# Patient Record
Sex: FEMALE | Race: WHITE | NOT HISPANIC OR LATINO | Employment: PART TIME | URBAN - METROPOLITAN AREA
[De-identification: names, ages, dates, MRNs, and addresses within clinical notes are randomized per-mention and may not be internally consistent; named-entity substitution may affect disease eponyms.]

---

## 2019-06-20 ENCOUNTER — NON-PROVIDER VISIT (OUTPATIENT)
Dept: OCCUPATIONAL MEDICINE | Facility: CLINIC | Age: 19
End: 2019-06-20
Payer: COMMERCIAL

## 2019-06-20 DIAGNOSIS — Z02.1 PRE-EMPLOYMENT DRUG SCREENING: ICD-10-CM

## 2019-06-20 DIAGNOSIS — Z02.1 PRE-EMPLOYMENT HEALTH SCREENING EXAMINATION: ICD-10-CM

## 2019-06-20 LAB
AMP AMPHETAMINE: NORMAL
COC COCAINE: NORMAL
INT CON NEG: NORMAL
INT CON POS: NORMAL
MET METHAMPHETAMINES: NORMAL
OPI OPIATES: NORMAL
PCP PHENCYCLIDINE: NORMAL
POC DRUG COMMENT 753798-OCCUPATIONAL HEALTH: NEGATIVE
THC: NORMAL

## 2019-06-20 PROCEDURE — 86580 TB INTRADERMAL TEST: CPT | Performed by: PREVENTIVE MEDICINE

## 2019-06-20 PROCEDURE — 80305 DRUG TEST PRSMV DIR OPT OBS: CPT | Performed by: PREVENTIVE MEDICINE

## 2019-06-23 ENCOUNTER — NON-PROVIDER VISIT (OUTPATIENT)
Dept: URGENT CARE | Facility: CLINIC | Age: 19
End: 2019-06-23
Payer: COMMERCIAL

## 2019-06-23 LAB — TB WHEAL 3D P 5 TU DIAM: NORMAL MM

## 2019-09-19 ENCOUNTER — GYNECOLOGY VISIT (OUTPATIENT)
Dept: OBGYN | Facility: MEDICAL CENTER | Age: 19
End: 2019-09-19
Payer: COMMERCIAL

## 2019-09-19 VITALS — WEIGHT: 129 LBS | SYSTOLIC BLOOD PRESSURE: 100 MMHG | DIASTOLIC BLOOD PRESSURE: 70 MMHG

## 2019-09-19 DIAGNOSIS — Z30.09 ENCOUNTER FOR COUNSELING REGARDING CONTRACEPTION: ICD-10-CM

## 2019-09-19 DIAGNOSIS — Z11.3 ROUTINE SCREENING FOR STI (SEXUALLY TRANSMITTED INFECTION): ICD-10-CM

## 2019-09-19 PROCEDURE — 99202 OFFICE O/P NEW SF 15 MIN: CPT | Performed by: OBSTETRICS & GYNECOLOGY

## 2019-09-19 RX ORDER — DROSPIRENONE AND ETHINYL ESTRADIOL 0.03MG-3MG
1 KIT ORAL DAILY
Qty: 28 TAB | Refills: 11 | Status: SHIPPED | OUTPATIENT
Start: 2019-09-19 | End: 2021-01-29

## 2019-09-19 SDOH — HEALTH STABILITY: MENTAL HEALTH: HOW OFTEN DO YOU HAVE A DRINK CONTAINING ALCOHOL?: NEVER

## 2019-09-19 NOTE — PROGRESS NOTES
GYN Visit    CC: discuss contraception    HPI: Yadira Massey is a 18 y.o.  here to discuss contraception.  Patient reports she has been on the pill in the past but was not very good at taking it every day.  Currently using condoms only for contraception, one current sexual partner.  Taking classes at [employer], not really sure she wants to study.  Reports her periods have been a little irregular since stopping birth control in April, previously have always been normal.  Reports she has taken a negative pregnancy test      ROS:  constitutional: denies fevers, general concerns  CV: denies chest pain, palpitations, edema  Resp: denies shortness of breath, cough  GI: denies abd pain, N/V, diarrhea/constipation, blood in stool  : + vaginal bleeding (on period now), discharge, pain, denies urinary complaints  Neuro: denies hx of migraines   Psych: reports no concerns about mood, feels well    OB History    Para Term  AB Living   0 0 0 0 0 0   SAB TAB Ectopic Molar Multiple Live Births   0 0 0 0 0 0       GYN Hx:   Menses last 5 days, regular until recently  Denies hx of STIs  Reports that she has completed the Gardasil series  Never had a vaginal exam    Past Medical History:   Diagnosis Date   • Back pain        Past Surgical History:   Procedure Laterality Date   • OTHER      Heart   • TONSILLECTOMY         Medications:   None    Allergies: Patient has no known allergies.    Social History     Tobacco Use   • Smoking status: Never Smoker   • Smokeless tobacco: Never Used   Substance Use Topics   • Alcohol use: Never     Frequency: Never   • Drug use: Never         Family History   Problem Relation Age of Onset   • Thyroid Maternal Aunt    • Cancer Maternal Grandmother         breast   denies VTEs in the family    Physical Exam:  /70 (BP Location: Left arm, Patient Position: Sitting)   Wt 58.5 kg (129 lb)   LMP 2019   Breastfeeding? No   gen: AAO, NAD, mood and affect  appropriate      A/P: 18 y.o.  here to discuss contraception  1. Encounter for counseling regarding contraception  REFERRAL TO GYNECOLOGY   2. Routine screening for STI (sexually transmitted infection)  CHLAMYDIA/GC PCR URINE OR SWAB   I discussed with Yadira Massey efficacy and side effects of contraceptive options including combined hormonal contraceptives (COCs, nuvaring, patch), IUD (levonorgestrel and copper), nexplanon and Depo-medroxyprogesterone acetate.  Patient reports she thought IUDs can be used in women who have been pregnant in the past, reports she was told this by another physician who is very old.  Discussed that this is not true, and that we commonly use IUDs in women who have never been pregnant and most of them do very well with them.  Also discussed the risk of uterine perforation, which patient reports her mom had, as 1.4 per 1000 IUD insertions.  After our discussion, pt desires nexplanon.  Discussed to expect irregular bleeding with Nexplanon, which can improve with time sometimes continues throughout  Use.  Patient expressed understanding.    Recommend STI screen, at least gonorrhea and chlamydia.  Patient amenable to gonorrhea and chlamydia screen of the urine in the lab.  Declines pelvic exam today or self collection with vaginal swab in the office.  Patient reports she had a bunch of blood work recently, declines serum STI screening.    Patient is status post Gardasil series    Offered and excepting of interim contraception prior to next home placement.  Patient desires ALMA ROSA.  Reviewed increased risk of VTE with all estrogen containing medications.    Nexplanon ordered today.  RTC as soon as device available for placement    Patient was seen for 20 minutes of which > 50% of appointment time was spent on face-to-face counseling and coordination of care regarding the above.      Domitila Da Silva MD  [facility], Women's Health

## 2019-10-29 ENCOUNTER — HOSPITAL ENCOUNTER (OUTPATIENT)
Dept: LAB | Facility: MEDICAL CENTER | Age: 19
End: 2019-10-29
Attending: OBSTETRICS & GYNECOLOGY
Payer: COMMERCIAL

## 2019-10-29 DIAGNOSIS — Z11.3 ROUTINE SCREENING FOR STI (SEXUALLY TRANSMITTED INFECTION): ICD-10-CM

## 2019-10-29 PROCEDURE — 87591 N.GONORRHOEAE DNA AMP PROB: CPT

## 2019-10-29 PROCEDURE — 87491 CHLMYD TRACH DNA AMP PROBE: CPT

## 2019-10-30 ENCOUNTER — GYNECOLOGY VISIT (OUTPATIENT)
Dept: OBGYN | Facility: MEDICAL CENTER | Age: 19
End: 2019-10-30
Payer: COMMERCIAL

## 2019-10-30 DIAGNOSIS — Z30.017 NEXPLANON INSERTION: Primary | ICD-10-CM

## 2019-10-30 LAB
C TRACH DNA SPEC QL NAA+PROBE: NEGATIVE
N GONORRHOEA DNA SPEC QL NAA+PROBE: NEGATIVE
SPECIMEN SOURCE: NORMAL

## 2019-10-30 PROCEDURE — 11981 INSERTION DRUG DLVR IMPLANT: CPT | Performed by: NURSE PRACTITIONER

## 2019-10-30 NOTE — PROGRESS NOTES
nexplanon insert Exp 4/24/22  Lot [id number]  Patient is currently on OCP. I have advised her for 1 week overlap then she may DC OCP.     Procedure note; Nexplanon insertion;    Informed consent obtained, consent signed-discussed the risks of Nexplanon; pain, bleeding, infection, bruising, possible pregnancy, difficulty with removing implant, possible scarring to arm.   All the risks and benefits of the procedure and the device are explained and patient verbalizes understanding and signs the consent     Urine hCG negative    Patient positioned supine with nondominant arm exposed.    Medial epicondyle of left arm palpated    Surgical cyndie placed 8-10 cm medial to the medial epicondyle    Betadine prep the skin    Local anesthesia-1% lidocaine injected using a 1 1/2 inch 27 gauge needle     Implant inserted via the Nexplanon insertion device subdermally in a sterile fashion    The patient and provider can feel the implant under the skin and the blue end of the insertion device is present indicating implant insertion    Steristrip and sterile 4x4's     Pressure bandage placed    Patient instructed to remove dressing  in 24 hours    Patient instructed to use a band-aid for 2 days there after    Patient tolerated the procedure well    Followup in 4-6  weeks for post insertion checkup

## 2020-03-01 ENCOUNTER — OFFICE VISIT (OUTPATIENT)
Dept: URGENT CARE | Facility: CLINIC | Age: 20
End: 2020-03-01
Payer: COMMERCIAL

## 2020-03-01 VITALS
HEIGHT: 67 IN | HEART RATE: 83 BPM | TEMPERATURE: 98.8 F | DIASTOLIC BLOOD PRESSURE: 62 MMHG | OXYGEN SATURATION: 97 % | RESPIRATION RATE: 16 BRPM | BODY MASS INDEX: 19.3 KG/M2 | WEIGHT: 123 LBS | SYSTOLIC BLOOD PRESSURE: 102 MMHG
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
## 2020-03-01 DIAGNOSIS — L08.9 SOFT TISSUE INFECTION: ICD-10-CM

## 2020-03-01 PROCEDURE — 99203 OFFICE O/P NEW LOW 30 MIN: CPT | Performed by: FAMILY MEDICINE

## 2020-03-01 RX ORDER — ETONOGESTREL 68 MG/1
1 IMPLANT SUBCUTANEOUS ONCE
COMMUNITY
End: 2021-01-29

## 2020-03-01 RX ORDER — AMOXICILLIN AND CLAVULANATE POTASSIUM 875; 125 MG/1; MG/1
1 TABLET, FILM COATED ORAL 2 TIMES DAILY
Qty: 14 TAB | Refills: 0 | Status: SHIPPED | OUTPATIENT
Start: 2020-03-01 | End: 2020-03-08

## 2020-03-01 ASSESSMENT — ENCOUNTER SYMPTOMS
VOMITING: 0
NAUSEA: 0
MYALGIAS: 0
WEIGHT LOSS: 0
EYE REDNESS: 0
EYE DISCHARGE: 0

## 2020-03-01 NOTE — PROGRESS NOTES
"Subjective:      Yadira Massey is a 19 y.o. female who presents with Other (red spot on nose, concerned it may be infected, there for 2 days.)            2d worsening redness and pain at site of dogbite to nose. Healthy known vaccinated dog. Some purulent drainage. No fever. Pain is moderate. No other aggravating or alleviating factors.        Review of Systems   Constitutional: Negative for malaise/fatigue and weight loss.   Eyes: Negative for discharge and redness.   Gastrointestinal: Negative for nausea and vomiting.   Musculoskeletal: Negative for joint pain and myalgias.   Skin: Negative for itching and rash.     .  Medications, Allergies, and current problem list reviewed today in Epic       Objective:     /62 (BP Location: Right arm, Patient Position: Sitting, BP Cuff Size: Adult)   Pulse 83   Temp 37.1 °C (98.8 °F) (Temporal)   Resp 16   Ht 1.702 m (5' 7\")   Wt 55.8 kg (123 lb)   SpO2 97%   BMI 19.26 kg/m²      Physical Exam  Constitutional:       General: She is not in acute distress.     Appearance: She is well-developed.   HENT:      Head: Normocephalic and atraumatic.        Mouth/Throat:      Mouth: Mucous membranes are moist.      Pharynx: Oropharynx is clear.   Eyes:      Conjunctiva/sclera: Conjunctivae normal.   Cardiovascular:      Rate and Rhythm: Normal rate and regular rhythm.      Heart sounds: Normal heart sounds. No murmur.   Pulmonary:      Effort: Pulmonary effort is normal.      Breath sounds: Normal breath sounds. No wheezing.   Skin:     General: Skin is warm and dry.      Findings: No rash.   Neurological:      Mental Status: She is alert and oriented to person, place, and time.                 Assessment/Plan:     1. Dog bite, initial encounter  amoxicillin-clavulanate (AUGMENTIN) 875-125 MG Tab   2. Soft tissue infection  amoxicillin-clavulanate (AUGMENTIN) 875-125 MG Tab     Differential diagnosis, natural history, supportive care, and indications for immediate " follow-up discussed at length.

## 2020-05-24 ENCOUNTER — HOSPITAL ENCOUNTER (OUTPATIENT)
Dept: RADIOLOGY | Facility: MEDICAL CENTER | Age: 20
End: 2020-05-24
Attending: PHYSICIAN ASSISTANT
Payer: COMMERCIAL

## 2020-05-24 ENCOUNTER — OFFICE VISIT (OUTPATIENT)
Dept: URGENT CARE | Facility: CLINIC | Age: 20
End: 2020-05-24
Payer: COMMERCIAL

## 2020-05-24 VITALS
HEART RATE: 72 BPM | TEMPERATURE: 98.1 F | WEIGHT: 120 LBS | RESPIRATION RATE: 16 BRPM | HEIGHT: 66 IN | SYSTOLIC BLOOD PRESSURE: 110 MMHG | OXYGEN SATURATION: 97 % | DIASTOLIC BLOOD PRESSURE: 68 MMHG | BODY MASS INDEX: 19.29 KG/M2

## 2020-05-24 DIAGNOSIS — R22.31 AXILLARY MASS, RIGHT: ICD-10-CM

## 2020-05-24 PROCEDURE — 76882 US LMTD JT/FCL EVL NVASC XTR: CPT | Mod: RT

## 2020-05-24 PROCEDURE — 99214 OFFICE O/P EST MOD 30 MIN: CPT | Performed by: PHYSICIAN ASSISTANT

## 2020-05-24 ASSESSMENT — ENCOUNTER SYMPTOMS
EYES NEGATIVE: 1
FOCAL WEAKNESS: 0
TINGLING: 0
CHILLS: 0
FEVER: 0
CARDIOVASCULAR NEGATIVE: 1
GASTROINTESTINAL NEGATIVE: 1
SENSORY CHANGE: 0
RESPIRATORY NEGATIVE: 1

## 2020-05-24 NOTE — PROGRESS NOTES
"Subjective:      Yadira Massey is a 19 y.o. female who presents with Adenopathy (Couple wks nodes s wollen on right armpit.)        HPI    Two swollen hard lumps to right armpit onset around 1 month.     Painful. Pain is increasing gradually. Exacerbated with moving arm such as turning the steering wheel.   Gradually enlarging.   Has been shaving. She tried not shaving for 2 weeks with no change.   No treatments tried.     No recent illness illness in the past month.  She denies any other symptoms.  Denies any fever, chills, redness, swelling, numbness, tingling, headache, vision changes.  No overlying skin changes or discharge.   No personal history of cancer.  Grandmother had breast cancer..    Review of Systems   Constitutional: Negative for chills and fever.   HENT: Negative.    Eyes: Negative.    Respiratory: Negative.    Cardiovascular: Negative.    Gastrointestinal: Negative.    Skin:        Lumps in right arm pit  Painful lumps   Neurological: Negative for tingling, sensory change and focal weakness.          Objective:     /68   Pulse 72   Temp 36.7 °C (98.1 °F)   Resp 16   Ht 1.676 m (5' 6\")   Wt 54.4 kg (120 lb)   SpO2 97%   BMI 19.37 kg/m²      Physical Exam  Vitals signs reviewed. Exam conducted with a chaperone present.   Constitutional:       General: She is not in acute distress.     Appearance: Normal appearance. She is not ill-appearing or toxic-appearing.   HENT:      Mouth/Throat:      Mouth: Mucous membranes are moist.      Pharynx: Oropharynx is clear. No oropharyngeal exudate or posterior oropharyngeal erythema.   Eyes:      Conjunctiva/sclera: Conjunctivae normal.      Pupils: Pupils are equal, round, and reactive to light.   Cardiovascular:      Rate and Rhythm: Normal rate and regular rhythm.      Heart sounds: Normal heart sounds.   Pulmonary:      Effort: Pulmonary effort is normal. No respiratory distress.      Breath sounds: Normal breath sounds. No wheezing, " rhonchi or rales.   Chest:      Chest wall: No mass, lacerations, deformity, swelling, tenderness, crepitus or edema.      Breasts: Breasts are symmetrical.         Right: Normal. No swelling, bleeding, inverted nipple, mass, nipple discharge, skin change or tenderness.         Left: Normal. No swelling, bleeding, inverted nipple, mass, nipple discharge, skin change or tenderness.          Comments: Right axilla: Two small 0.5 - 1 cm round hard mobile nodule. Painful to the palpation.   No erythema, edema, skin discoloration, discharge, fluctuance.    Abdominal:      General: Abdomen is flat. Bowel sounds are normal. There is no distension.      Palpations: There is no mass.      Tenderness: There is no abdominal tenderness. There is no guarding or rebound.   Lymphadenopathy:      Cervical: No cervical adenopathy.      Upper Body:      Right upper body: Axillary adenopathy present. No supraclavicular or pectoral adenopathy.      Left upper body: No supraclavicular, axillary or pectoral adenopathy.      Lower Body: No right inguinal adenopathy. No left inguinal adenopathy.   Skin:     General: Skin is warm and dry.      Findings: No bruising, erythema, lesion or rash.   Neurological:      General: No focal deficit present.      Mental Status: She is alert and oriented to person, place, and time.   Psychiatric:         Mood and Affect: Mood normal.         Behavior: Behavior normal.       Past Medical History:   Diagnosis Date   • Back pain       Past Surgical History:   Procedure Laterality Date   • OTHER      Heart   • TONSILLECTOMY        Social History     Socioeconomic History   • Marital status: Single     Spouse name: Not on file   • Number of children: Not on file   • Years of education: Not on file   • Highest education level: Not on file   Occupational History   • Not on file   Social Needs   • Financial resource strain: Not on file   • Food insecurity     Worry: Not on file     Inability: Not on file   •  Transportation needs     Medical: Not on file     Non-medical: Not on file   Tobacco Use   • Smoking status: Never Smoker   • Smokeless tobacco: Never Used   Substance and Sexual Activity   • Alcohol use: Never     Frequency: Never   • Drug use: Never   • Sexual activity: Yes     Partners: Male   Lifestyle   • Physical activity     Days per week: Not on file     Minutes per session: Not on file   • Stress: Not on file   Relationships   • Social connections     Talks on phone: Not on file     Gets together: Not on file     Attends [employer] service: Not on file     Active member of club or organization: Not on file     Attends meetings of clubs or organizations: Not on file     Relationship status: Not on file   • Intimate partner violence     Fear of current or ex partner: Not on file     Emotionally abused: Not on file     Physically abused: Not on file     Forced sexual activity: Not on file   Other Topics Concern   • Not on file   Social History Narrative   • Not on file    Patient has no known allergies.         Assessment/Plan:     1. Axillary mass, right    - US-EXTREMITY NON VASCULAR UNILATERAL RIGHT; Results per radiologist interpretation below    TECHNIQUE/EXAM DESCRIPTION AND NUMBER OF VIEWS:  Ultrasound of the soft tissues of the right axilla.     COMPARISON: None     FINDINGS:  In the area of palpable abnormality as indicated by the patient in the right axilla, there is an ill-defined collection measuring 1.17 x 0.72 x 0.35 cm located within the subcutaneous soft tissues. A second heterogeneous collection measures 0.76 x 0.54 x   0.3 cm. There is no significant hyperemia identified.     IMPRESSION:     2 heterogeneous collections within the subcutaneous soft tissues in the right axilla in the area of palpable abnormality. These could represent hematomas if the patient has had a history of trauma. Infection is not excluded though no significant   hyperemia is seen. Clinical correlation  recommended.    05/25/20: Spoke to the patient on the phone regarding ultrasound results per radiologist interpretation above.  Discussed with patient this may be inflamed gland or cyst within subcutaneous tissue as evident from examination and reproduction of pain.  Her suspicions for metastasis very well.  We will treat for infection with doxycycline for 7 days.  Also have recommended warm compresses 4 times per day for about 25 minutes each time. Ibuprofen for pain.   Referral to family medicine for follow-up appointment.  Return to the urgent care if symptoms are not improving or changing in 1 week or sooner if any worsening symptoms such as redness of the skin, swelling, drainage, increasing in size of masses, fevers, chills or any other concerns.     Supportive care, differential diagnoses, and indications for immediate follow-up discussed with patient.    Pathogenesis of diagnosis discussed including typical length and natural progression. Patient expresses understanding and agrees to plan.    Please note that this dictation was created using voice recognition software. I have made every reasonable attempt to correct obvious errors, but I expect that there are errors of grammar and possibly content that I did not discover before finalizing the note.

## 2020-05-25 RX ORDER — DOXYCYCLINE HYCLATE 100 MG
100 TABLET ORAL 2 TIMES DAILY
Qty: 14 TAB | Refills: 0 | Status: SHIPPED | OUTPATIENT
Start: 2020-05-25 | End: 2020-06-01

## 2021-01-29 ENCOUNTER — GYNECOLOGY VISIT (OUTPATIENT)
Dept: OBGYN | Facility: CLINIC | Age: 21
End: 2021-01-29
Payer: COMMERCIAL

## 2021-01-29 VITALS — BODY MASS INDEX: 20.34 KG/M2 | DIASTOLIC BLOOD PRESSURE: 75 MMHG | SYSTOLIC BLOOD PRESSURE: 115 MMHG | WEIGHT: 126 LBS

## 2021-01-29 DIAGNOSIS — Z30.46 NEXPLANON REMOVAL: ICD-10-CM

## 2021-01-29 PROCEDURE — 11982 REMOVE DRUG IMPLANT DEVICE: CPT | Performed by: OBSTETRICS & GYNECOLOGY

## 2021-01-29 RX ORDER — NORGESTIMATE AND ETHINYL ESTRADIOL 0.25-0.035
1 KIT ORAL DAILY
Qty: 84 TAB | Refills: 4 | Status: SHIPPED | OUTPATIENT
Start: 2021-01-29 | End: 2022-04-25

## 2021-01-29 NOTE — NON-PROVIDER
Pt states that she would like her nexplanon removed because she has been bleeding for a year and she is very caputo  Pharmacy verified  Good # [phone]

## 2021-01-29 NOTE — PROGRESS NOTES
Procedure Note : Nexplanon Removal       Pt desires nexplanon removal for continuous bleeding, not interested in trying any medication with nexplanon still in place.      Nexplanon easily palpable in LUE.      Left upper extremity positioned so the medial aspect is exposed.  Area over palpable nexplanon insert cleansed with betadine x3.  approximately 2cc of 1% licocaine with epinephrine infiltrated over distal end of implant.  Approximately 2-3mm incision made with 11 blade, nexplanon grasped with hemostat walked out with 2 hemostats, progressively grasping up the device and breaking up adhesions to device with 4x4.  Device removed through incision, noted to be intact, and discarded.  Pressure dressing placed.  Pt tolerated procedure well.  Complications: none        - prior to removal, I discussed w/ pt risks including pain/bleeding/infection.  All questions answered and consents signed.  Remove pressure dressing tonight or prior of uncomfortable    - alternate contraception: pt desires ALMA ROSA for now.  rx sent to pharmacy, if start right away will not have break in contraceptive coverage.   - pt to return for WWE (due for first pap)    Domitila Da Silva MD  [facility], Women's Health

## 2021-02-01 ENCOUNTER — HOSPITAL ENCOUNTER (EMERGENCY)
Facility: MEDICAL CENTER | Age: 21
End: 2021-02-01
Attending: EMERGENCY MEDICINE
Payer: COMMERCIAL

## 2021-02-01 ENCOUNTER — APPOINTMENT (OUTPATIENT)
Dept: RADIOLOGY | Facility: MEDICAL CENTER | Age: 21
End: 2021-02-01
Attending: EMERGENCY MEDICINE
Payer: COMMERCIAL

## 2021-02-01 VITALS
HEART RATE: 78 BPM | WEIGHT: 127.21 LBS | BODY MASS INDEX: 20.44 KG/M2 | OXYGEN SATURATION: 98 % | SYSTOLIC BLOOD PRESSURE: 114 MMHG | DIASTOLIC BLOOD PRESSURE: 74 MMHG | RESPIRATION RATE: 16 BRPM | HEIGHT: 66 IN | TEMPERATURE: 99 F

## 2021-02-01 DIAGNOSIS — G89.29 CHRONIC NONINTRACTABLE HEADACHE, UNSPECIFIED HEADACHE TYPE: ICD-10-CM

## 2021-02-01 DIAGNOSIS — R51.9 CHRONIC NONINTRACTABLE HEADACHE, UNSPECIFIED HEADACHE TYPE: ICD-10-CM

## 2021-02-01 DIAGNOSIS — Z30.41 ORAL CONTRACEPTIVE USE: ICD-10-CM

## 2021-02-01 LAB
ALBUMIN SERPL BCP-MCNC: 4.9 G/DL (ref 3.2–4.9)
ALBUMIN/GLOB SERPL: 1.6 G/DL
ALP SERPL-CCNC: 99 U/L (ref 30–99)
ALT SERPL-CCNC: 14 U/L (ref 2–50)
ANION GAP SERPL CALC-SCNC: 11 MMOL/L (ref 7–16)
AST SERPL-CCNC: 17 U/L (ref 12–45)
BASOPHILS # BLD AUTO: 0.4 % (ref 0–1.8)
BASOPHILS # BLD: 0.03 K/UL (ref 0–0.12)
BILIRUB SERPL-MCNC: 0.5 MG/DL (ref 0.1–1.5)
BUN SERPL-MCNC: 17 MG/DL (ref 8–22)
CALCIUM SERPL-MCNC: 9.8 MG/DL (ref 8.4–10.2)
CHLORIDE SERPL-SCNC: 100 MMOL/L (ref 96–112)
CO2 SERPL-SCNC: 24 MMOL/L (ref 20–33)
CREAT SERPL-MCNC: 0.71 MG/DL (ref 0.5–1.4)
EOSINOPHIL # BLD AUTO: 0.04 K/UL (ref 0–0.51)
EOSINOPHIL NFR BLD: 0.6 % (ref 0–6.9)
ERYTHROCYTE [DISTWIDTH] IN BLOOD BY AUTOMATED COUNT: 39.8 FL (ref 35.9–50)
GLOBULIN SER CALC-MCNC: 3.1 G/DL (ref 1.9–3.5)
GLUCOSE SERPL-MCNC: 87 MG/DL (ref 65–99)
HCG SERPL QL: NEGATIVE
HCT VFR BLD AUTO: 43.9 % (ref 37–47)
HGB BLD-MCNC: 14.8 G/DL (ref 12–16)
IMM GRANULOCYTES # BLD AUTO: 0.01 K/UL (ref 0–0.11)
IMM GRANULOCYTES NFR BLD AUTO: 0.1 % (ref 0–0.9)
LYMPHOCYTES # BLD AUTO: 2.37 K/UL (ref 1–4.8)
LYMPHOCYTES NFR BLD: 33.2 % (ref 22–41)
MCH RBC QN AUTO: 29.2 PG (ref 27–33)
MCHC RBC AUTO-ENTMCNC: 33.7 G/DL (ref 33.6–35)
MCV RBC AUTO: 86.8 FL (ref 81.4–97.8)
MONOCYTES # BLD AUTO: 0.49 K/UL (ref 0–0.85)
MONOCYTES NFR BLD AUTO: 6.9 % (ref 0–13.4)
NEUTROPHILS # BLD AUTO: 4.2 K/UL (ref 2–7.15)
NEUTROPHILS NFR BLD: 58.8 % (ref 44–72)
NRBC # BLD AUTO: 0 K/UL
NRBC BLD-RTO: 0 /100 WBC
PLATELET # BLD AUTO: 276 K/UL (ref 164–446)
PMV BLD AUTO: 9.9 FL (ref 9–12.9)
POTASSIUM SERPL-SCNC: 4 MMOL/L (ref 3.6–5.5)
PROT SERPL-MCNC: 8 G/DL (ref 6–8.2)
RBC # BLD AUTO: 5.06 M/UL (ref 4.2–5.4)
SODIUM SERPL-SCNC: 135 MMOL/L (ref 135–145)
WBC # BLD AUTO: 7.1 K/UL (ref 4.8–10.8)

## 2021-02-01 PROCEDURE — 99283 EMERGENCY DEPT VISIT LOW MDM: CPT

## 2021-02-01 PROCEDURE — 700117 HCHG RX CONTRAST REV CODE 255: Performed by: EMERGENCY MEDICINE

## 2021-02-01 PROCEDURE — 84703 CHORIONIC GONADOTROPIN ASSAY: CPT

## 2021-02-01 PROCEDURE — 80053 COMPREHEN METABOLIC PANEL: CPT

## 2021-02-01 PROCEDURE — 70496 CT ANGIOGRAPHY HEAD: CPT

## 2021-02-01 PROCEDURE — 85025 COMPLETE CBC W/AUTO DIFF WBC: CPT

## 2021-02-01 RX ORDER — HYDROCODONE BITARTRATE AND ACETAMINOPHEN 5; 325 MG/1; MG/1
1 TABLET ORAL ONCE
Status: DISCONTINUED | OUTPATIENT
Start: 2021-02-01 | End: 2021-02-01 | Stop reason: HOSPADM

## 2021-02-01 RX ORDER — BUTALBITAL, ACETAMINOPHEN AND CAFFEINE 50; 325; 40 MG/1; MG/1; MG/1
1 TABLET ORAL EVERY 4 HOURS PRN
Qty: 15 TAB | Refills: 0 | Status: SHIPPED | OUTPATIENT
Start: 2021-02-01 | End: 2021-02-06

## 2021-02-01 RX ADMIN — IOHEXOL 80 ML: 350 INJECTION, SOLUTION INTRAVENOUS at 19:02

## 2021-02-02 NOTE — ED PROVIDER NOTES
ED Provider Note    Chief Complaint:   Headache    HPI:  Yadira Massey is a 20 y.o. female who presents for evaluation of headache.  She describes headache that has been ongoing for the past 3 months, progressively worsening since time of onset.  She describes a persistent dull headache localized to the temporal areas, as well as the forehead.  She states that 2 to 3 weeks ago her symptoms seem to have significantly worsened, now describing worsening pain as well as associated lightheadedness.  She has not had any associated nausea or vomiting, but does have associated photophobia.  She denies any associated neck pain, denies chest pain or shortness of breath.  She reports no prior history of DVT nor pulmonary embolism, very recently had her Nexplanon contraception removed, and within the past week transition to an oral contraceptive tablet.  Otherwise, she reports no DVT risk factors.  She has not had any associated fevers, no neck or back pain.  She denies any vision changes.  She states that she feels some fatigue with less energy than usual, but denies weakness in the arms or legs, denies symptoms of vertigo, denies any difficulty with ambulation.  She is unable to identify any exacerbating factors.    Review of Systems:  See HPI for pertinent positives and negatives. All other systems negative.    Past Medical History:   has a past medical history of Back pain.    Social History:  Social History     Tobacco Use   • Smoking status: Never Smoker   • Smokeless tobacco: Never Used   Substance and Sexual Activity   • Alcohol use: Never     Frequency: Never   • Drug use: Never   • Sexual activity: Yes     Partners: Male       Surgical History:   has a past surgical history that includes other and tonsillectomy.    Current Medications:  Home Medications    **Home medications have not yet been reviewed for this encounter**         Allergies:  No Known Allergies    Physical Exam:  Vital Signs: /85   Pulse  "81   Temp 36.6 °C (97.8 °F) (Temporal)   Resp 16   Ht 1.676 m (5' 6\")   Wt 57.7 kg (127 lb 3.3 oz)   LMP 01/31/2021 (Exact Date)   SpO2 98%   BMI 20.53 kg/m²   Constitutional: Alert, no acute distress  HENT: Normocephalic, mask in place  Eyes: Pupils equal and reactive, normal conjunctiva  Neck: Supple, normal range of motion, no stridor  Cardiovascular: Extremities are warm and well perfused  Pulmonary: No respiratory distress, normal work of breathing  Abdomen: Nondistended  Skin: Warm, dry, no rashes or lesions  Musculoskeletal: Normal range of motion in all extremities, no swelling or deformity noted  Neurologic: CN II-XII intact, speech normal, muscle strength 5/5 in all four extremities, normal  strength bilaterally, sensation grossly intact, normal finger-to-nose, no pronator drift  Psychiatric: Normal and appropriate mood and affect    Medical records reviewed for continuity of care.  GYN visit reviewed from 1/29/2021.  Patient was seen for Nexplanon removal and switch to combined oral contraceptive tablets.    Labs:  Labs Reviewed   CBC WITH DIFFERENTIAL   COMP METABOLIC PANEL   HCG QUAL SERUM   ESTIMATED GFR       Radiology:  CT-CTA HEAD WITH & W/O-POST PROCESS   Final Result      No CT evidence of dural venous or other thrombosis      CT angiogram of the [place] within normal limits.           ED Medications Administered:  Medications   HYDROcodone-acetaminophen (NORCO) 5-325 MG per tablet 1 Tab (1 Tab Oral Refused 2/1/21 1830)   iohexol (OMNIPAQUE) 350 mg/mL (80 mL Intravenous Given 2/1/21 1902)       Differential diagnosis:  Tension headache, headache not otherwise specified, intracranial mass, dural sinus thrombus    MDM:  Patient presents for evaluation of 3 months of headache, worse over the last 2 to 3 weeks.  On arrival to the emergency department she has no focal neurologic deficits, no nausea, no vomiting.  She reports no history of head injury.  She is afebrile, has no " history of fevers, no meningeal signs, no evidence of infectious etiology.  No thunderclap onset to suggest subarachnoid hemorrhage.    Due to Nexplanon and oral contraceptive tablet use, I did obtain a CTV to evaluate for dural sinus thrombus.  MRI is not available at this facility after hours.    Screening lab work ordered to obtain contrasted study, CBC and CMP are within normal limits, hCG is negative.    CT of the head with and without contrast is within normal limits, no CT evidence of dural venous or other thrombosis.  No intracranial mass identified.    At this time, etiology of the patient's headaches are unclear.  She was without focal neurologic deficits.  Plan is for discharge home, will prescribe a short course of Fioricet to see if this helps with her headaches.  She is also referred to neurology given her chronic headaches without cause.  She is counseled follow-up with her primary care physician within 24 to 48 hours to review her emergency department visit today. Return precautions were discussed with the patient, and provided in written form with the patient's discharge instructions.     Personal protective equipment including N95 surgical respirator, goggles, and gloves were used during this encounter.     Disposition:  Discharge home in stable condition    Final Impression:  1. Chronic nonintractable headache, unspecified headache type    2. Oral contraceptive use        Electronically signed by: Pao Gayle MD, 2/1/2021 7:42 PM

## 2021-02-02 NOTE — DISCHARGE INSTRUCTIONS
Please follow-up with your primary care physician.  If you do not have a primary care physician, you may review your insurance documentation to find a primary care physician who is in network.  You may also try to primary care clinic listed above.  You may also call [facility] to schedule follow-up appointment.  Please call the neurologist listed above to schedule a follow-up appointment as well, in the event that your primary care physician is unable to effectively treat your headaches.  Return to the emergency department if you develop any new or worsening symptoms including worsening headache, nausea, vomiting, vision changes, fevers, or if you have any further concerns.  Additionally, please return in 24 to 48 hours if the medication is not improving your symptoms, and your headaches are not improving.

## 2021-02-02 NOTE — ED TRIAGE NOTES
Chief Complaint   Patient presents with   • Headache     3 months of a constant headache, mother anali come to the ED tonight to have a work up.   This headache is at her temples and forehead, sometimes it moves to the back of her head when she lies down. No N/V but does report lightheadedness.

## 2021-02-02 NOTE — ED NOTES
Discharge instructions given and discussed. RX for Fioricet  given and pt educated. Pt educated to come back to ER for new or worsening symptoms and follow up with PCP as instructed. Pt verbalized understanding. VSS. Pt  Discharged in stable condition.

## 2021-02-02 NOTE — ED NOTES
Pt alert and oriented. Pt complains of temporal headache, denies n/v or vision changes. Pt states she has been feeling like she has less energy.

## 2021-02-25 ENCOUNTER — NURSE TRIAGE (OUTPATIENT)
Dept: HEALTH INFORMATION MANAGEMENT | Facility: OTHER | Age: 21
End: 2021-02-25

## 2021-02-25 ENCOUNTER — TELEPHONE (OUTPATIENT)
Dept: HEALTH INFORMATION MANAGEMENT | Facility: OTHER | Age: 21
End: 2021-02-25

## 2021-02-26 ENCOUNTER — TELEPHONE (OUTPATIENT)
Dept: OBGYN | Facility: CLINIC | Age: 21
End: 2021-02-26

## 2021-02-26 NOTE — TELEPHONE ENCOUNTER
Yadira is reporting since nexplanon was placed on 10/2019 she has had continued vaginal bleeding.  Pt then had nexplanon taken out 01/2021and started oral contraceptive. Pt continues to have heavy bleeding using 6-8 tampons daily.  She is now experiencing headaches, weakness and dizziness over the last 2 to 3 months.    Pt has had vaginal bleeding for over a year as well as headaches for 2 to 3 months.     1. Caller Name: Yadira Massey                   Call Back Number: [phone] (home)     [facility] PCP or Specialty Provider: Yes Pcp Pt States None          2.  Has the patient previously tested positive for COVID-19? No    3.  In the last two weeks, has the patient had any new or worsening symptoms (not explained by alternative diagnosis)? No.    4.  Does patient have any comoribidities? None     5.  Has the patient had any known contact with someone who is suspected or confirmed to have COVID-19? No.    5. Disposition: Cleared by RN Triage as potential is low for COVID-19; OK to keep/schedule appointment    Note routed to [facility] Provider: BRITTNY only.           Reason for Disposition  • Periods last > 7 days    Additional Information  • Negative: SEVERE vaginal bleeding (e.g., continuous red blood from vagina, or large blood clots) and very weak (can't stand)  • Negative: Passed out (i.e., fainted, collapsed and was not responding)  • Negative: Difficult to awaken or acting confused (e.g., disoriented, slurred speech)  • Negative: Shock suspected (e.g., cold/pale/clammy skin, too weak to stand, low BP, rapid pulse)  • Negative: Sounds like a life-threatening emergency to the triager  • Negative: Pregnant > 20 weeks (5 months or more)  • Negative: Pregnant < 20 weeks (less than 5 months)  • Negative: Postpartum (from 0 to 6 weeks after delivery)  • Negative: Vaginal discharge is the main symptom and bleeding is slight  • Negative: SEVERE abdominal pain (e.g., excruciating)  • Negative: SEVERE dizziness  "(e.g., unable to stand, requires support to walk, feels like passing out now)  • Negative: SEVERE vaginal bleeding (i.e., soaking 2 pads or tampons per hour and present 2 or more hours; 1 menstrual cup every 2 hours)  • Negative: MODERATE vaginal bleeding (i.e., soaking pad or tampon per hour and present > 6 hours; 1 menstrual cup every 6 hours)  • Negative: Pale skin (pallor) of new onset or worsening  • Negative: Constant abdominal pain lasting > 2 hours  • Negative: Patient sounds very sick or weak to the triager  • Negative: Taking Coumadin (warfarin) or other strong blood thinner, or known bleeding disorder (e.g., thrombocytopenia)  • Negative: Skin bruises or nosebleed and not caused by an injury  • Negative: Bleeding/spotting after procedure (e.g., biopsy) or pelvic examination (e.g., pap smear) that persists > 3 days  • Negative: Patient wants to be seen  • Negative: Passed tissue (e.g., gray-white)  • Negative: Periods with > 6 soaked pads or tampons per day    Answer Assessment - Initial Assessment Questions  1. AMOUNT: \"Describe the bleeding that you are having.\"     - SPOTTING: spotting, or pinkish / brownish mucous discharge; does not fill panti-liner or pad     - MILD:  less than 1 pad / hour; less than patient's usual menstrual bleeding    - MODERATE: 1-2 pads / hour; 1 menstrual cup every 6 hours; small-medium blood clots (e.g., pea, grape, small coin)    - SEVERE: soaking 2 or more pads/hour for 2 or more hours; 1 menstrual cup every 2 hours; bleeding not contained by pads or continuous red blood from vagina; large blood clots (e.g., golf ball, large coin)       6 to 8 tampons daily  2. ONSET: \"When did the bleeding begin?\" \"Is it continuing now?\"      One year and a few months  3. MENSTRUAL PERIOD: \"When was the last normal menstrual period?\" \"How is this different than your period?\"      Over a year and 1/2  4. REGULARITY: \"How regular are your periods?\"      no  5. ABDOMINAL PAIN: \"Do you have " "any pain?\" \"How bad is the pain?\"  (e.g., Scale 1-10; mild, moderate, or severe)    - MILD (1-3): doesn't interfere with normal activities, abdomen soft and not tender to touch     - MODERATE (4-7): interferes with normal activities or awakens from sleep, tender to touch     - SEVERE (8-10): excruciating pain, doubled over, unable to do any normal activities       no  6. PREGNANCY: \"Could you be pregnant?\" \"Are you sexually active?\" \"Did you recently give birth?\"      no  7. BREASTFEEDING: \"Are you breastfeeding?\"      no  8. HORMONES: \"Are you taking any hormone medications, prescription or OTC?\" (e.g., birth control pills, estrogen)      no  9. BLOOD THINNERS: \"Do you take any blood thinners?\" (e.g., Coumadin/warfarin, Pradaxa/dabigatran, aspirin)      no  10. CAUSE: \"What do you think is causing the bleeding?\" (e.g., recent gyn surgery, recent gyn procedure; known bleeding disorder, cervical cancer, polycystic ovarian disease, fibroids)          unknown  11. HEMODYNAMIC STATUS: \"Are you weak or feeling lightheaded?\" If so, ask: \"Can you stand and walk normally?\"        Weak and light headed  12. OTHER SYMPTOMS: \"What other symptoms are you having with the bleeding?\" (e.g., passed tissue, vaginal discharge, fever, menstrual-type cramps)        Headaches.    Protocols used: VAGINAL BLEEDING - [id number]      "

## 2021-02-26 NOTE — TELEPHONE ENCOUNTER
Domitila Da Silva M.D.  [facility] 26 minutes ago (2:25 PM)     Please offer pt appt to f/u if she is having problems; notified that she called nursing line.     thanks    Message text    Litzy Valdivia R.N.  Domitila Da Silva M.D. 21 hours ago (5:03 PM)     Yadira is reporting since nexplanon was placed on 10/2019 she has had continued vaginal bleeding.  Pt then had nexplanon taken out 01/2021and started oral contraceptive. Pt continues to have heavy bleeding using 6-8 tampons daily.  She is now experiencing headaches, weakness and dizziness over the last 2 to 3 months.      Routing comment          2/26/2021 1455 Left message for pt to call back regarding conversation pt had with RN.   3/1/2021 1041 Left message for pt to call back regarding conversation pt had with RN.   3/3/2021 0920 Left message for pt to call back regarding message above regarding Nexplanon.   Also [employer] message sent.   3/4/2021 pt saw [employer] message on 3/3/2021 at 1052.

## 2021-03-02 ENCOUNTER — OFFICE VISIT (OUTPATIENT)
Dept: MEDICAL GROUP | Facility: MEDICAL CENTER | Age: 21
End: 2021-03-02
Payer: COMMERCIAL

## 2021-03-02 ENCOUNTER — HOSPITAL ENCOUNTER (OUTPATIENT)
Dept: LAB | Facility: MEDICAL CENTER | Age: 21
End: 2021-03-02
Attending: FAMILY MEDICINE
Payer: COMMERCIAL

## 2021-03-02 VITALS
TEMPERATURE: 99 F | HEIGHT: 66 IN | WEIGHT: 119.05 LBS | HEART RATE: 83 BPM | BODY MASS INDEX: 19.13 KG/M2 | OXYGEN SATURATION: 97 % | SYSTOLIC BLOOD PRESSURE: 110 MMHG | DIASTOLIC BLOOD PRESSURE: 70 MMHG | RESPIRATION RATE: 14 BRPM

## 2021-03-02 DIAGNOSIS — G44.221 CHRONIC TENSION-TYPE HEADACHE, INTRACTABLE: ICD-10-CM

## 2021-03-02 DIAGNOSIS — R53.83 FATIGUE, UNSPECIFIED TYPE: ICD-10-CM

## 2021-03-02 DIAGNOSIS — Z00.00 VISIT FOR PREVENTIVE HEALTH EXAMINATION: ICD-10-CM

## 2021-03-02 PROBLEM — Z30.017 NEXPLANON INSERTION: Status: RESOLVED | Noted: 2019-10-30 | Resolved: 2021-03-02

## 2021-03-02 LAB
25(OH)D3 SERPL-MCNC: 18 NG/ML (ref 30–100)
FERRITIN SERPL-MCNC: 80.5 NG/ML (ref 10–291)
VIT B12 SERPL-MCNC: 545 PG/ML (ref 211–911)

## 2021-03-02 PROCEDURE — 82607 VITAMIN B-12: CPT

## 2021-03-02 PROCEDURE — 82306 VITAMIN D 25 HYDROXY: CPT

## 2021-03-02 PROCEDURE — 36415 COLL VENOUS BLD VENIPUNCTURE: CPT

## 2021-03-02 PROCEDURE — 99395 PREV VISIT EST AGE 18-39: CPT | Performed by: FAMILY MEDICINE

## 2021-03-02 PROCEDURE — 82728 ASSAY OF FERRITIN: CPT

## 2021-03-02 ASSESSMENT — ENCOUNTER SYMPTOMS
DIARRHEA: 0
VOMITING: 0
SHORTNESS OF BREATH: 0
FEVER: 0
PALPITATIONS: 0
CHILLS: 0
WEAKNESS: 0
BLURRED VISION: 0
DEPRESSION: 0
HEADACHES: 1
CONSTIPATION: 0
NAUSEA: 0

## 2021-03-02 ASSESSMENT — FIBROSIS 4 INDEX: FIB4 SCORE: 0.35

## 2021-03-02 ASSESSMENT — PATIENT HEALTH QUESTIONNAIRE - PHQ9: CLINICAL INTERPRETATION OF PHQ2 SCORE: 1

## 2021-03-02 NOTE — PATIENT INSTRUCTIONS
Please take Excedrin migraine in the morning for 4 days, you can also use Tylenol or Ibuprofen at night for these 4 days. On day 5 don't take anything and see how the headache is.

## 2021-03-02 NOTE — PROGRESS NOTES
History of Present Illness  20 year old female presents to clinic to establish care. She has been having one constant headache for the last two months. There was no inciting trauma or event. The pain is mostly located in her temples bilaterally, but is occasionally across her forehead. There is no associated localized weakness, tingling, or noise sensitivity. She is sensitive to lights.  She has tried Ibuprofen, Tylenol, and Aleve none of which improved the pain. She went to the Emergency Department 2/1/2021 for her headaches, which led to a negative work-up including labs and CT head. She has no history of migraines.    She is also concerned about fatigue, she states she cannot remember a time in her life when she was not tired.. She recently had a Nexplanon removed, prior to that she had a constant menstrual period for the last year. She is wondering if she is potentially anemic. She does get 8 hours of sleep. She has good sleep hygiene, goes to bed at 9 PM and wakes up at 6 AM. Typically she does not feel rested upon wakening. 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 She does not snore, she does not wake up at night, she has no problems with restless leg. Caffeine intake is minimal, consisting of one soda per week.    She denies any other questions or concerns at this time.    ROS  Review of Systems   Constitutional: Positive for malaise/fatigue. Negative for chills and fever.   HENT: Negative for hearing loss.    Eyes: Negative for blurred vision.   Respiratory: Negative for shortness of breath.    Cardiovascular: Negative for chest pain and palpitations.   Gastrointestinal: Negative for constipation, diarrhea, nausea and vomiting.   Genitourinary: Negative for dysuria and hematuria.   Skin: Negative for rash.   Neurological: Positive for headaches. Negative for weakness.   Psychiatric/Behavioral: Negative for depression.     Medications  Current Outpatient Medications   Medication Sig Dispense Refill   •  "norgestimate-ethinyl estradiol (ORTHO-CYCLEN) 0.25-35 MG-MCG per tablet Take 1 Tab by mouth every day. 84 Tab 4     No current facility-administered medications for this visit.     Allergies  No Known Allergies    Problem List  Patient Active Problem List   Diagnosis   (none) - all problems resolved or deleted     Past Medical History  Past Medical History:   Diagnosis Date   • Back pain      Past Surgical History  Past Surgical History:   Procedure Laterality Date   • OTHER CARDIAC SURGERY  2015    Ablation   • OTHER      Heart   • TONSILLECTOMY       Past Family History  Family History   Problem Relation Age of Onset   • Cancer Maternal Grandmother         breast   • No Known Problems Mother    • No Known Problems Father    • No Known Problems Sister    • Heart Disease Maternal Grandfather    • No Known Problems Sister      Social History  She reports eating a healthy and balanced diet, as well as getting regular exercise. She works full time as a [employer] . She denies any alcohol, tobacco product, or illicit drug use. She is sexually active with one, male partner and uses OCPs and condoms as contraception.     Physical Exam  /70 (BP Location: Left arm, Patient Position: Sitting, BP Cuff Size: Adult)   Pulse 83   Temp 37.2 °C (99 °F) (Temporal)   Resp 14   Ht 1.676 m (5' 6\")   Wt 54 kg (119 lb 0.8 oz)   LMP 01/31/2021 (Exact Date)   SpO2 97%   BMI 19.21 kg/m²   Physical Exam   Constitutional: She is well-developed, well-nourished, and in no distress. No distress.   HENT:   Head: Normocephalic and atraumatic.   Right Ear: Tympanic membrane, external ear and ear canal normal.   Left Ear: Tympanic membrane, external ear and ear canal normal.   Eyes: Pupils are equal, round, and reactive to light. Right eye exhibits no discharge. Left eye exhibits no discharge. No scleral icterus.   Neck: No thyromegaly present.   Cardiovascular: Normal rate, regular rhythm and normal heart sounds. "   Pulmonary/Chest: Effort normal and breath sounds normal. No respiratory distress.   Abdominal: Soft. Bowel sounds are normal. She exhibits no distension. There is no abdominal tenderness.   Musculoskeletal:         General: No edema.   Neurological: She is alert.   Equal strength and sensation to extremities x4   Skin: Skin is warm and dry. She is not diaphoretic.   Psychiatric: Affect and judgment normal.     Assessment & Plan  1. Visit for preventive health examination  Health maintenance status reviewed and updated. We discussed diet, exercise, vaccinations, skin cancer prevention and detection, seat belt use, and regular eye and dental exams. Pap smear is due, she will return for this.    2. Chronic tension-type headache, intractable  Labs and imaging from the emergency department were reviewed, and within normal limits. Likely a tension type headache. I did encourage the patient to use Excedrin Migraine as regular Tylenol and ibuprofen did not help. If this is not helpful, we can consider Imitrex to see if that helps.    3. Fatigue, unspecified type  Recent labs reveal that she is not anemic. Unlikely to be obstructive sleep apnea, or restless leg. We will check vitamin D, B12, and iron levels to see if this is contributing. Depression screen was negative.  - VITAMIN B12; Future  - VITAMIN D,25 HYDROXY; Future  - FERRITIN; Future    Return when convenient for well woman exam.    Maia Gilmore M.D.

## 2021-03-03 PROBLEM — E55.9 VITAMIN D DEFICIENCY: Status: ACTIVE | Noted: 2021-03-03

## 2021-03-16 NOTE — PROGRESS NOTES
"History of Present Illness  20 year old female presents to clinic for cervical cancer screening.  She has not had any children. She is sexually active with one, male partner and uses OCPs and condoms as contraception.  She denies any vaginal discharge, odors, or abnormalities.     She does have however note a constant menstrual cycle.  This started when her Nexplanon was inserted in November 2019, due to the side effect she had it removed in either January or February 2021.  At that same time she was started on Ortho oral contraception pills.  She has continued to bleed constantly, and does not notice any heavier flow on her week of withdrawal from the OCPs.     She denies any other questions or concerns at this time.    Current problem list, current medication, and past medical/surgical history were reviewed.     ROS  See HPI    Physical Exam  /68 (BP Location: Left arm, Patient Position: Sitting, BP Cuff Size: Adult)   Pulse 76   Temp 37.1 °C (98.8 °F) (Temporal)   Resp 16   Ht 1.676 m (5' 6\")   Wt 56 kg (123 lb 7.3 oz)   SpO2 97%   BMI 19.93 kg/m²   Physical Exam   Constitutional: She is well-developed, well-nourished, and in no distress. No distress.   Cardiovascular: Normal rate, regular rhythm and normal heart sounds.   Pulmonary/Chest: Effort normal and breath sounds normal. No respiratory distress.   Abdominal: Soft. Bowel sounds are normal.   Genitourinary:    Vulva, vagina, cervix and uterus normal.     Neurological: She is alert.   Skin: Skin is warm and dry. She is not diaphoretic.   Psychiatric: Affect and judgment normal.     Assessment & Plan  1. Cervical cancer screening  Pap smear updated today in office.  We did explain that due to her menstrual bleeding there may be some blood in the sample, which makes it difficult for lab to interpret.  I believe the sample will be okay, and will let her know the results via [facility].  - THINPREP RFLX HPV ASCUS W/CTNG; Future    Return in about 1 " year (around 3/18/2022) for Annual physical.    Maia Gilmore M.D.

## 2021-03-18 ENCOUNTER — OFFICE VISIT (OUTPATIENT)
Dept: MEDICAL GROUP | Facility: MEDICAL CENTER | Age: 21
End: 2021-03-18
Payer: COMMERCIAL

## 2021-03-18 ENCOUNTER — HOSPITAL ENCOUNTER (OUTPATIENT)
Facility: MEDICAL CENTER | Age: 21
End: 2021-03-18
Attending: FAMILY MEDICINE
Payer: COMMERCIAL

## 2021-03-18 VITALS
WEIGHT: 123.46 LBS | RESPIRATION RATE: 16 BRPM | OXYGEN SATURATION: 97 % | HEART RATE: 76 BPM | BODY MASS INDEX: 19.84 KG/M2 | SYSTOLIC BLOOD PRESSURE: 116 MMHG | DIASTOLIC BLOOD PRESSURE: 68 MMHG | TEMPERATURE: 98.8 F | HEIGHT: 66 IN

## 2021-03-18 DIAGNOSIS — Z12.4 CERVICAL CANCER SCREENING: ICD-10-CM

## 2021-03-18 PROCEDURE — 87591 N.GONORRHOEAE DNA AMP PROB: CPT

## 2021-03-18 PROCEDURE — 99000 SPECIMEN HANDLING OFFICE-LAB: CPT | Performed by: FAMILY MEDICINE

## 2021-03-18 PROCEDURE — 99213 OFFICE O/P EST LOW 20 MIN: CPT | Performed by: FAMILY MEDICINE

## 2021-03-18 PROCEDURE — 88175 CYTOPATH C/V AUTO FLUID REDO: CPT

## 2021-03-18 PROCEDURE — 87491 CHLMYD TRACH DNA AMP PROBE: CPT

## 2021-03-18 ASSESSMENT — FIBROSIS 4 INDEX: FIB4 SCORE: 0.35

## 2021-03-19 DIAGNOSIS — Z12.4 CERVICAL CANCER SCREENING: ICD-10-CM

## 2021-03-22 PROBLEM — R87.629 ABNORMAL VAGINAL PAP SMEAR: Status: ACTIVE | Noted: 2021-03-22

## 2021-03-23 LAB
C TRACH DNA GENITAL QL NAA+PROBE: NEGATIVE
CYTOLOGY REG CYTOL: NORMAL
N GONORRHOEA DNA GENITAL QL NAA+PROBE: NEGATIVE
SPECIMEN SOURCE: NORMAL

## 2022-04-13 ENCOUNTER — OFFICE VISIT (OUTPATIENT)
Dept: URGENT CARE | Facility: PHYSICIAN GROUP | Age: 22
End: 2022-04-13
Payer: COMMERCIAL

## 2022-04-13 ENCOUNTER — HOSPITAL ENCOUNTER (OUTPATIENT)
Facility: MEDICAL CENTER | Age: 22
End: 2022-04-13
Attending: STUDENT IN AN ORGANIZED HEALTH CARE EDUCATION/TRAINING PROGRAM
Payer: COMMERCIAL

## 2022-04-13 VITALS
WEIGHT: 120 LBS | DIASTOLIC BLOOD PRESSURE: 68 MMHG | HEIGHT: 66 IN | TEMPERATURE: 97.1 F | HEART RATE: 85 BPM | OXYGEN SATURATION: 99 % | RESPIRATION RATE: 16 BRPM | SYSTOLIC BLOOD PRESSURE: 110 MMHG | BODY MASS INDEX: 19.29 KG/M2

## 2022-04-13 DIAGNOSIS — B96.89 VAGINITIS DUE TO GARDNERELLA VAGINALIS: ICD-10-CM

## 2022-04-13 DIAGNOSIS — N76.0 VAGINITIS DUE TO GARDNERELLA VAGINALIS: ICD-10-CM

## 2022-04-13 LAB
APPEARANCE UR: CLEAR
BILIRUB UR STRIP-MCNC: NEGATIVE MG/DL
COLOR UR AUTO: YELLOW
GLUCOSE UR STRIP.AUTO-MCNC: NEGATIVE MG/DL
KETONES UR STRIP.AUTO-MCNC: NEGATIVE MG/DL
LEUKOCYTE ESTERASE UR QL STRIP.AUTO: NORMAL
NITRITE UR QL STRIP.AUTO: NEGATIVE
PH UR STRIP.AUTO: 7.5 [PH] (ref 5–8)
PROT UR QL STRIP: NEGATIVE MG/DL
RBC UR QL AUTO: NORMAL
SP GR UR STRIP.AUTO: 1.01
UROBILINOGEN UR STRIP-MCNC: NORMAL MG/DL

## 2022-04-13 PROCEDURE — 99213 OFFICE O/P EST LOW 20 MIN: CPT | Performed by: STUDENT IN AN ORGANIZED HEALTH CARE EDUCATION/TRAINING PROGRAM

## 2022-04-13 PROCEDURE — 87660 TRICHOMONAS VAGIN DIR PROBE: CPT

## 2022-04-13 PROCEDURE — 81002 URINALYSIS NONAUTO W/O SCOPE: CPT | Performed by: STUDENT IN AN ORGANIZED HEALTH CARE EDUCATION/TRAINING PROGRAM

## 2022-04-13 PROCEDURE — 87186 SC STD MICRODIL/AGAR DIL: CPT

## 2022-04-13 PROCEDURE — 87480 CANDIDA DNA DIR PROBE: CPT

## 2022-04-13 PROCEDURE — 87077 CULTURE AEROBIC IDENTIFY: CPT

## 2022-04-13 PROCEDURE — 87510 GARDNER VAG DNA DIR PROBE: CPT

## 2022-04-13 PROCEDURE — 87086 URINE CULTURE/COLONY COUNT: CPT

## 2022-04-13 RX ORDER — METRONIDAZOLE 500 MG/1
500 TABLET ORAL 2 TIMES DAILY
Qty: 14 TABLET | Refills: 0 | Status: SHIPPED | OUTPATIENT
Start: 2022-04-13 | End: 2022-04-25

## 2022-04-13 ASSESSMENT — FIBROSIS 4 INDEX: FIB4 SCORE: 0.36

## 2022-04-14 LAB
CANDIDA DNA VAG QL PROBE+SIG AMP: NEGATIVE
G VAGINALIS DNA VAG QL PROBE+SIG AMP: POSITIVE
T VAGINALIS DNA VAG QL PROBE+SIG AMP: NEGATIVE

## 2022-04-14 NOTE — PROGRESS NOTES
"Subjective:   CHIEF COMPLAINT  Chief Complaint   Patient presents with   • Dysuria     2 weeks now.        HPI  Yadira Massey is a 21 y.o. female who presents with a chief complaint of fishy odor from her vagina associated with dysuria, slight pruritus and discharge.  Symptoms started 2 weeks ago.  She has not tried taking medications.  No concerns for STIs.    REVIEW OF SYSTEMS  General: no fever or chills  GI: no nausea or vomiting  See HPI for further details.    PAST MEDICAL HISTORY  Patient Active Problem List    Diagnosis Date Noted   • Abnormal vaginal Pap smear 03/22/2021   • Vitamin D deficiency 03/03/2021       SURGICAL HISTORY   has a past surgical history that includes other; tonsillectomy; and other cardiac surgery (2015).    ALLERGIES  No Known Allergies    CURRENT MEDICATIONS  Home Medications     Reviewed by Darrel Srinivasan'kellie (Medical Assistant) on 04/13/22 at 1719  Med List Status: <None>   Medication Last Dose Status   norgestimate-ethinyl estradiol (ORTHO-CYCLEN) 0.25-35 MG-MCG per tablet Taking Active                SOCIAL HISTORY  Social History     Tobacco Use   • Smoking status: Never Smoker   • Smokeless tobacco: Never Used   Vaping Use   • Vaping Use: Never used   Substance and Sexual Activity   • Alcohol use: Never   • Drug use: Never   • Sexual activity: Yes     Partners: Male       FAMILY HISTORY  Family History   Problem Relation Age of Onset   • Cancer Maternal Grandmother         breast   • No Known Problems Mother    • No Known Problems Father    • No Known Problems Sister    • Heart Disease Maternal Grandfather    • No Known Problems Sister           Objective:   PHYSICAL EXAM  VITAL SIGNS: /68   Pulse 85   Temp 36.2 °C (97.1 °F)   Resp 16   Ht 1.676 m (5' 6\")   Wt 54.4 kg (120 lb)   SpO2 99%   BMI 19.37 kg/m²     Gen: no acute distress, normal voice  Skin: dry, intact, moist mucosal membranes  Lungs: CTAB w/ symmetric expansion  CV: RRR w/o murmurs or " clicks  Psych: normal affect, normal judgement, alert, awake    UA: Trace blood.  Small leukocytes.  No nitrates.    hCG negative    Assessment/Plan:     1. Vaginitis due to Gardnerella vaginalis  POCT Urinalysis    URINE CULTURE(NEW)    VAGINAL PATHOGENS DNA PANEL    metroNIDAZOLE (FLAGYL) 500 MG Tab   Signs and symptoms are consistent with a BV infection.  -Ordered Rx for Flagyl.  No alcohol while taking this medication  -Ordered urine culture  -Ordered vaginal pathogens.  Contact patient at [phone] only if she needs to be started on a new/different medication.  Otherwise results will be viewed through [facility]  -Return to urgent care any new/worsening symptoms or further questions or concerns.  Patient understood everything discussed.  All questions were answered.    Differential diagnosis, natural history, supportive care, and indications for immediate follow-up discussed. All questions answered. Patient agrees with the plan of care.    Follow-up as needed if symptoms worsen or fail to improve to PCP, Urgent care or Emergency Room.    Please note that this dictation was created using voice recognition software. I have made a reasonable attempt to correct obvious errors, but I expect that there are errors of grammar and possibly content that I did not discover before finalizing the note.

## 2022-04-16 LAB
BACTERIA UR CULT: ABNORMAL
BACTERIA UR CULT: ABNORMAL
SIGNIFICANT IND 70042: ABNORMAL
SITE SITE: ABNORMAL
SOURCE SOURCE: ABNORMAL

## 2022-04-17 ENCOUNTER — TELEPHONE (OUTPATIENT)
Dept: URGENT CARE | Facility: CLINIC | Age: 22
End: 2022-04-17
Payer: COMMERCIAL

## 2022-04-17 DIAGNOSIS — N30.01 ACUTE CYSTITIS WITH HEMATURIA: ICD-10-CM

## 2022-04-17 RX ORDER — SULFAMETHOXAZOLE AND TRIMETHOPRIM 800; 160 MG/1; MG/1
1 TABLET ORAL 2 TIMES DAILY
Qty: 6 TABLET | Refills: 0 | Status: SHIPPED | OUTPATIENT
Start: 2022-04-17 | End: 2022-04-20

## 2022-04-17 NOTE — TELEPHONE ENCOUNTER
I contacted the patient and left her voicemail the phone reviewing her lab results.  Urine culture did demonstrate growth so prescription for Bactrim was sent to her pharmacy.  Additionally she tested positive for BV; she was instructed to continue taking her Flagyl through completion.  Contact the clinic if she has any further questions or concerns.

## 2022-04-25 ENCOUNTER — OFFICE VISIT (OUTPATIENT)
Dept: MEDICAL GROUP | Facility: PHYSICIAN GROUP | Age: 22
End: 2022-04-25
Payer: COMMERCIAL

## 2022-04-25 VITALS
TEMPERATURE: 98.7 F | WEIGHT: 129 LBS | DIASTOLIC BLOOD PRESSURE: 68 MMHG | BODY MASS INDEX: 20.25 KG/M2 | HEIGHT: 67 IN | SYSTOLIC BLOOD PRESSURE: 110 MMHG | OXYGEN SATURATION: 98 % | HEART RATE: 107 BPM

## 2022-04-25 DIAGNOSIS — Z30.41 ORAL CONTRACEPTIVE PILL SURVEILLANCE: ICD-10-CM

## 2022-04-25 DIAGNOSIS — E55.9 VITAMIN D DEFICIENCY: ICD-10-CM

## 2022-04-25 DIAGNOSIS — Z76.89 ENCOUNTER TO ESTABLISH CARE: ICD-10-CM

## 2022-04-25 DIAGNOSIS — R53.83 OTHER FATIGUE: ICD-10-CM

## 2022-04-25 DIAGNOSIS — R87.629 ABNORMAL VAGINAL PAP SMEAR: ICD-10-CM

## 2022-04-25 PROCEDURE — 99214 OFFICE O/P EST MOD 30 MIN: CPT | Performed by: NURSE PRACTITIONER

## 2022-04-25 RX ORDER — NORETHINDRONE ACETATE AND ETHINYL ESTRADIOL .02; 1 MG/1; MG/1
1 TABLET ORAL DAILY
Qty: 84 TABLET | Refills: 1 | Status: SHIPPED | OUTPATIENT
Start: 2022-04-25 | End: 2022-08-01 | Stop reason: SDUPTHER

## 2022-04-25 SDOH — ECONOMIC STABILITY: TRANSPORTATION INSECURITY
IN THE PAST 12 MONTHS, HAS THE LACK OF TRANSPORTATION KEPT YOU FROM MEDICAL APPOINTMENTS OR FROM GETTING MEDICATIONS?: NO

## 2022-04-25 SDOH — ECONOMIC STABILITY: TRANSPORTATION INSECURITY
IN THE PAST 12 MONTHS, HAS LACK OF RELIABLE TRANSPORTATION KEPT YOU FROM MEDICAL APPOINTMENTS, MEETINGS, WORK OR FROM GETTING THINGS NEEDED FOR DAILY LIVING?: NO

## 2022-04-25 SDOH — HEALTH STABILITY: PHYSICAL HEALTH: ON AVERAGE, HOW MANY DAYS PER WEEK DO YOU ENGAGE IN MODERATE TO STRENUOUS EXERCISE (LIKE A BRISK WALK)?: 5 DAYS

## 2022-04-25 SDOH — ECONOMIC STABILITY: TRANSPORTATION INSECURITY
IN THE PAST 12 MONTHS, HAS LACK OF TRANSPORTATION KEPT YOU FROM MEETINGS, WORK, OR FROM GETTING THINGS NEEDED FOR DAILY LIVING?: NO

## 2022-04-25 SDOH — ECONOMIC STABILITY: FOOD INSECURITY: WITHIN THE PAST 12 MONTHS, THE FOOD YOU BOUGHT JUST DIDN'T LAST AND YOU DIDN'T HAVE MONEY TO GET MORE.: PATIENT DECLINED

## 2022-04-25 SDOH — HEALTH STABILITY: PHYSICAL HEALTH: ON AVERAGE, HOW MANY MINUTES DO YOU ENGAGE IN EXERCISE AT THIS LEVEL?: 40 MIN

## 2022-04-25 SDOH — HEALTH STABILITY: MENTAL HEALTH
STRESS IS WHEN SOMEONE FEELS TENSE, NERVOUS, ANXIOUS, OR CAN'T SLEEP AT NIGHT BECAUSE THEIR MIND IS TROUBLED. HOW STRESSED ARE YOU?: NOT AT ALL

## 2022-04-25 SDOH — ECONOMIC STABILITY: INCOME INSECURITY: IN THE LAST 12 MONTHS, WAS THERE A TIME WHEN YOU WERE NOT ABLE TO PAY THE MORTGAGE OR RENT ON TIME?: NO

## 2022-04-25 SDOH — ECONOMIC STABILITY: HOUSING INSECURITY
IN THE LAST 12 MONTHS, WAS THERE A TIME WHEN YOU DID NOT HAVE A STEADY PLACE TO SLEEP OR SLEPT IN A SHELTER (INCLUDING NOW)?: NO

## 2022-04-25 SDOH — ECONOMIC STABILITY: INCOME INSECURITY: HOW HARD IS IT FOR YOU TO PAY FOR THE VERY BASICS LIKE FOOD, HOUSING, MEDICAL CARE, AND HEATING?: PATIENT DECLINED

## 2022-04-25 SDOH — ECONOMIC STABILITY: FOOD INSECURITY: WITHIN THE PAST 12 MONTHS, YOU WORRIED THAT YOUR FOOD WOULD RUN OUT BEFORE YOU GOT MONEY TO BUY MORE.: PATIENT DECLINED

## 2022-04-25 SDOH — ECONOMIC STABILITY: HOUSING INSECURITY

## 2022-04-25 ASSESSMENT — SOCIAL DETERMINANTS OF HEALTH (SDOH)
WITHIN THE PAST 12 MONTHS, YOU WORRIED THAT YOUR FOOD WOULD RUN OUT BEFORE YOU GOT THE MONEY TO BUY MORE: PATIENT DECLINED
HOW OFTEN DO YOU ATTEND CHURCH OR RELIGIOUS SERVICES?: NEVER
DO YOU BELONG TO ANY CLUBS OR ORGANIZATIONS SUCH AS CHURCH GROUPS UNIONS, FRATERNAL OR ATHLETIC GROUPS, OR SCHOOL GROUPS?: NO
ARE YOU MARRIED, WIDOWED, DIVORCED, SEPARATED, NEVER MARRIED, OR LIVING WITH A PARTNER?: LIVING WITH PARTNER
ARE YOU MARRIED, WIDOWED, DIVORCED, SEPARATED, NEVER MARRIED, OR LIVING WITH A PARTNER?: LIVING WITH PARTNER
IN A TYPICAL WEEK, HOW MANY TIMES DO YOU TALK ON THE PHONE WITH FAMILY, FRIENDS, OR NEIGHBORS?: THREE TIMES A WEEK
HOW OFTEN DO YOU GET TOGETHER WITH FRIENDS OR RELATIVES?: PATIENT DECLINED
HOW HARD IS IT FOR YOU TO PAY FOR THE VERY BASICS LIKE FOOD, HOUSING, MEDICAL CARE, AND HEATING?: PATIENT DECLINED
HOW OFTEN DO YOU HAVE A DRINK CONTAINING ALCOHOL: NEVER
IN A TYPICAL WEEK, HOW MANY TIMES DO YOU TALK ON THE PHONE WITH FAMILY, FRIENDS, OR NEIGHBORS?: THREE TIMES A WEEK
HOW OFTEN DO YOU ATTENT MEETINGS OF THE CLUB OR ORGANIZATION YOU BELONG TO?: NEVER
HOW OFTEN DO YOU ATTENT MEETINGS OF THE CLUB OR ORGANIZATION YOU BELONG TO?: NEVER
HOW OFTEN DO YOU HAVE SIX OR MORE DRINKS ON ONE OCCASION: PATIENT DECLINED
HOW MANY DRINKS CONTAINING ALCOHOL DO YOU HAVE ON A TYPICAL DAY WHEN YOU ARE DRINKING: PATIENT DECLINED
HOW OFTEN DO YOU GET TOGETHER WITH FRIENDS OR RELATIVES?: PATIENT DECLINED
DO YOU BELONG TO ANY CLUBS OR ORGANIZATIONS SUCH AS CHURCH GROUPS UNIONS, FRATERNAL OR ATHLETIC GROUPS, OR SCHOOL GROUPS?: NO
HOW OFTEN DO YOU ATTEND CHURCH OR RELIGIOUS SERVICES?: NEVER

## 2022-04-25 ASSESSMENT — PATIENT HEALTH QUESTIONNAIRE - PHQ9: CLINICAL INTERPRETATION OF PHQ2 SCORE: 0

## 2022-04-25 ASSESSMENT — FIBROSIS 4 INDEX: FIB4 SCORE: 0.36

## 2022-04-25 ASSESSMENT — LIFESTYLE VARIABLES
HOW MANY STANDARD DRINKS CONTAINING ALCOHOL DO YOU HAVE ON A TYPICAL DAY: PATIENT DECLINED
HOW OFTEN DO YOU HAVE SIX OR MORE DRINKS ON ONE OCCASION: PATIENT DECLINED
HOW OFTEN DO YOU HAVE A DRINK CONTAINING ALCOHOL: NEVER

## 2022-04-25 NOTE — PROGRESS NOTES
Subjective:     CC:  Diagnoses of Encounter to establish care, Oral contraceptive pill surveillance, Other fatigue, Abnormal vaginal Pap smear, and Vitamin D deficiency were pertinent to this visit.    HISTORY OF THE PRESENT ILLNESS: Patient is a 21 y.o. female. This pleasant patient is here today to establish care and discuss the following. Her prior PCP was Dr. Maia Gilmore.    Oral contraceptive pill surveillance  She is taking ortho-cyclen 0.25-35 mg-mcg daily. She has been on oral birth control for the last 4 years. She states her current medication causes her cramps to be worse and breast tenderness. She has tried nexplanon and had continuous flow, removed after 1 year. She is able to take the pill every day. She is in a monogamous relationship with her current partner for the last 2 years. When she started her menstrual cycle she was having major cramping. She does not taking OTC medication for her cramps.     Abnormal vaginal Pap smear  She is due for repeat pap smear. Last pap smear showed low-grade squamous intraepithelial lesion with Candida species.    Other fatigue  She reports ongoing fatigue for over a year. She reports she sleeps well, averaging 8-10 hours a night, but does not wake up feeling rested. She does not snore. Her last PCP started vitamin D but this did not help.  Past history of SVT with ablation.      Allergies: Patient has no known allergies.    Current Outpatient Medications Ordered in Epic   Medication Sig Dispense Refill   • norethindrone-ethinyl estradiol (MICROGESTIN 1/20) 1-20 MG-MCG per tablet Take 1 Tablet by mouth every day. 84 Tablet 1     No current Epic-ordered facility-administered medications on file.       Past Medical History:   Diagnosis Date   • Back pain    • SVT (supraventricular tachycardia) (HCC) 2015       Past Surgical History:   Procedure Laterality Date   • OTHER ORTHOPEDIC SURGERY Right 2017    ankle reconstruction   • OTHER CARDIAC SURGERY  2015    ablation  "d/t SVT   • TONSILLECTOMY         Social History     Tobacco Use   • Smoking status: Never Smoker   • Smokeless tobacco: Never Used   Vaping Use   • Vaping Use: Never used   Substance Use Topics   • Alcohol use: Never   • Drug use: Never       Social History     Social History Narrative   • Not on file       Family History   Problem Relation Age of Onset   • Cancer Maternal Grandmother         breast   • Hypertension Mother    • No Known Problems Father    • No Known Problems Sister    • Heart Disease Maternal Grandfather    • Cancer Maternal Grandfather    • No Known Problems Sister    • Thyroid Paternal Aunt    • Thyroid Maternal Aunt        Health Maintenance: Declines COVID vaccination and Chlamydia screening today.  Due for updated Pap smear.      Objective:     Vital signs reviewed  Exam: /68 (BP Location: Left arm, Patient Position: Sitting, BP Cuff Size: Adult)   Pulse (!) 107   Temp 37.1 °C (98.7 °F) (Temporal)   Ht 1.702 m (5' 7\")   Wt 58.5 kg (129 lb)   SpO2 98%  Body mass index is 20.2 kg/m².    Gen: Alert and oriented, No apparent distress.  Neck: Neck is supple without lymphadenopathy.  Lungs: Normal effort, CTA bilaterally, no wheezes, rhonchi, or rales.    CV: Regular rate and rhythm. No murmurs, rubs, or gallops.  Ext: No clubbing, cyanosis, edema      Assessment & Plan:   21 y.o. female with the following -    1. Encounter to establish care  Acute uncomplicated problem.  Care established today.     2. Oral contraceptive pill surveillance  Chronic exacerbated problem.  With her breast soreness we will decrease her estrogen and with her cramping will increase her progesterone.  Stop Ortho-Cyclen.  Start Microgestin and follow-up in 3 months.  - norethindrone-ethinyl estradiol (MICROGESTIN 1/20) 1-20 MG-MCG per tablet; Take 1 Tablet by mouth every day.  Dispense: 84 Tablet; Refill: 1    3. Other fatigue  Chronic exacerbated problem.  Will check updated labs.  Differential diagnosis includes " anemia, iron deficiency, thyroid abnormality, electrolyte abnormality.  She is comfortable with her MyChart and [facility]  - CBC WITH DIFFERENTIAL; Future  - Comp Metabolic Panel; Future  - TSH WITH REFLEX TO FT4; Future  - IRON/TOTAL IRON BIND; Future  - FERRITIN; Future    4. Abnormal vaginal Pap smear  Chronic stable problem.  Due for 1 year repeat Pap smear.  She will schedule her follow-up Pap smear in the next 3 months.    5. Vitamin D deficiency  Chronic stable problem.  Due for updated labs.  - VITAMIN D,25 HYDROXY; Future      Return for annual with pap.    Please note that this dictation was created using voice recognition software. I have made every reasonable attempt to correct obvious errors, but I expect that there are errors of grammar and possibly content that I did not discover before finalizing the note.

## 2022-04-25 NOTE — ASSESSMENT & PLAN NOTE
She reports ongoing fatigue for over a year. She reports she sleeps well, averaging 8-10 hours a night, but does not wake up feeling rested. She does not snore. Her last PCP started vitamin D but this did not help.  Past history of SVT with ablation.

## 2022-04-25 NOTE — ASSESSMENT & PLAN NOTE
She is due for repeat pap smear. Last pap smear showed low-grade squamous intraepithelial lesion with Candida species.

## 2022-04-25 NOTE — ASSESSMENT & PLAN NOTE
She is taking ortho-cyclen 0.25-35 mg-mcg daily. She has been on oral birth control for the last 4 years. She states her current medication causes her cramps to be worse and breast tenderness. She has tried nexplanon and had continuous flow, removed after 1 year. She is able to take the pill every day. She is in a monogamous relationship with her current partner for the last 2 years. When she started her menstrual cycle she was having major cramping. She does not taking OTC medication for her cramps.

## 2022-05-09 ENCOUNTER — HOSPITAL ENCOUNTER (OUTPATIENT)
Dept: LAB | Facility: MEDICAL CENTER | Age: 22
End: 2022-05-09
Attending: NURSE PRACTITIONER
Payer: COMMERCIAL

## 2022-05-09 DIAGNOSIS — R53.83 OTHER FATIGUE: ICD-10-CM

## 2022-05-09 DIAGNOSIS — E55.9 VITAMIN D DEFICIENCY: ICD-10-CM

## 2022-05-09 LAB
25(OH)D3 SERPL-MCNC: 20 NG/ML (ref 30–100)
ALBUMIN SERPL BCP-MCNC: 4.5 G/DL (ref 3.2–4.9)
ALBUMIN/GLOB SERPL: 1.6 G/DL
ALP SERPL-CCNC: 70 U/L (ref 30–99)
ALT SERPL-CCNC: 14 U/L (ref 2–50)
ANION GAP SERPL CALC-SCNC: 13 MMOL/L (ref 7–16)
AST SERPL-CCNC: 15 U/L (ref 12–45)
BASOPHILS # BLD AUTO: 0.4 % (ref 0–1.8)
BASOPHILS # BLD: 0.03 K/UL (ref 0–0.12)
BILIRUB SERPL-MCNC: 0.4 MG/DL (ref 0.1–1.5)
BUN SERPL-MCNC: 12 MG/DL (ref 8–22)
CALCIUM SERPL-MCNC: 9.4 MG/DL (ref 8.5–10.5)
CHLORIDE SERPL-SCNC: 107 MMOL/L (ref 96–112)
CO2 SERPL-SCNC: 22 MMOL/L (ref 20–33)
CREAT SERPL-MCNC: 0.76 MG/DL (ref 0.5–1.4)
EOSINOPHIL # BLD AUTO: 0.02 K/UL (ref 0–0.51)
EOSINOPHIL NFR BLD: 0.3 % (ref 0–6.9)
ERYTHROCYTE [DISTWIDTH] IN BLOOD BY AUTOMATED COUNT: 37.9 FL (ref 35.9–50)
FERRITIN SERPL-MCNC: 162 NG/ML (ref 10–291)
GFR SERPLBLD CREATININE-BSD FMLA CKD-EPI: 113 ML/MIN/1.73 M 2
GLOBULIN SER CALC-MCNC: 2.9 G/DL (ref 1.9–3.5)
GLUCOSE SERPL-MCNC: 64 MG/DL (ref 65–99)
HCT VFR BLD AUTO: 39.8 % (ref 37–47)
HGB BLD-MCNC: 13.4 G/DL (ref 12–16)
IMM GRANULOCYTES # BLD AUTO: 0 K/UL (ref 0–0.11)
IMM GRANULOCYTES NFR BLD AUTO: 0 % (ref 0–0.9)
IRON SATN MFR SERPL: 36 % (ref 15–55)
IRON SERPL-MCNC: 114 UG/DL (ref 40–170)
LYMPHOCYTES # BLD AUTO: 2.63 K/UL (ref 1–4.8)
LYMPHOCYTES NFR BLD: 38.1 % (ref 22–41)
MCH RBC QN AUTO: 29.3 PG (ref 27–33)
MCHC RBC AUTO-ENTMCNC: 33.7 G/DL (ref 33.6–35)
MCV RBC AUTO: 86.9 FL (ref 81.4–97.8)
MONOCYTES # BLD AUTO: 0.47 K/UL (ref 0–0.85)
MONOCYTES NFR BLD AUTO: 6.8 % (ref 0–13.4)
NEUTROPHILS # BLD AUTO: 3.76 K/UL (ref 2–7.15)
NEUTROPHILS NFR BLD: 54.4 % (ref 44–72)
NRBC # BLD AUTO: 0 K/UL
NRBC BLD-RTO: 0 /100 WBC
PLATELET # BLD AUTO: 252 K/UL (ref 164–446)
PMV BLD AUTO: 10.7 FL (ref 9–12.9)
POTASSIUM SERPL-SCNC: 4 MMOL/L (ref 3.6–5.5)
PROT SERPL-MCNC: 7.4 G/DL (ref 6–8.2)
RBC # BLD AUTO: 4.58 M/UL (ref 4.2–5.4)
SODIUM SERPL-SCNC: 142 MMOL/L (ref 135–145)
TIBC SERPL-MCNC: 319 UG/DL (ref 250–450)
TSH SERPL DL<=0.005 MIU/L-ACNC: 0.99 UIU/ML (ref 0.38–5.33)
UIBC SERPL-MCNC: 205 UG/DL (ref 110–370)
WBC # BLD AUTO: 6.9 K/UL (ref 4.8–10.8)

## 2022-05-09 PROCEDURE — 82306 VITAMIN D 25 HYDROXY: CPT

## 2022-05-09 PROCEDURE — 80053 COMPREHEN METABOLIC PANEL: CPT

## 2022-05-09 PROCEDURE — 84443 ASSAY THYROID STIM HORMONE: CPT

## 2022-05-09 PROCEDURE — 83550 IRON BINDING TEST: CPT

## 2022-05-09 PROCEDURE — 85025 COMPLETE CBC W/AUTO DIFF WBC: CPT

## 2022-05-09 PROCEDURE — 36415 COLL VENOUS BLD VENIPUNCTURE: CPT

## 2022-05-09 PROCEDURE — 82728 ASSAY OF FERRITIN: CPT

## 2022-05-09 PROCEDURE — 83540 ASSAY OF IRON: CPT

## 2022-06-21 ENCOUNTER — PATIENT MESSAGE (OUTPATIENT)
Dept: MEDICAL GROUP | Facility: PHYSICIAN GROUP | Age: 22
End: 2022-06-21
Payer: COMMERCIAL

## 2022-06-21 DIAGNOSIS — E16.2 HYPOGLYCEMIA: ICD-10-CM

## 2022-06-22 ENCOUNTER — HOSPITAL ENCOUNTER (OUTPATIENT)
Dept: LAB | Facility: MEDICAL CENTER | Age: 22
End: 2022-06-22
Attending: NURSE PRACTITIONER
Payer: COMMERCIAL

## 2022-06-22 DIAGNOSIS — E16.2 HYPOGLYCEMIA: ICD-10-CM

## 2022-06-22 LAB
EST. AVERAGE GLUCOSE BLD GHB EST-MCNC: 91 MG/DL
HBA1C MFR BLD: 4.8 % (ref 4–5.6)

## 2022-06-22 PROCEDURE — 36415 COLL VENOUS BLD VENIPUNCTURE: CPT

## 2022-06-22 PROCEDURE — 83036 HEMOGLOBIN GLYCOSYLATED A1C: CPT

## 2022-08-01 ENCOUNTER — HOSPITAL ENCOUNTER (OUTPATIENT)
Facility: MEDICAL CENTER | Age: 22
End: 2022-08-01
Attending: NURSE PRACTITIONER
Payer: COMMERCIAL

## 2022-08-01 ENCOUNTER — OFFICE VISIT (OUTPATIENT)
Dept: MEDICAL GROUP | Facility: PHYSICIAN GROUP | Age: 22
End: 2022-08-01
Payer: COMMERCIAL

## 2022-08-01 VITALS
TEMPERATURE: 97.9 F | SYSTOLIC BLOOD PRESSURE: 108 MMHG | BODY MASS INDEX: 21.19 KG/M2 | OXYGEN SATURATION: 97 % | WEIGHT: 135 LBS | HEART RATE: 75 BPM | HEIGHT: 67 IN | DIASTOLIC BLOOD PRESSURE: 68 MMHG

## 2022-08-01 DIAGNOSIS — Z30.41 ORAL CONTRACEPTIVE PILL SURVEILLANCE: ICD-10-CM

## 2022-08-01 DIAGNOSIS — Z12.4 SCREENING FOR MALIGNANT NEOPLASM OF CERVIX: ICD-10-CM

## 2022-08-01 DIAGNOSIS — Z23 NEED FOR VACCINATION: ICD-10-CM

## 2022-08-01 DIAGNOSIS — Z01.419 WELL WOMAN EXAM WITH ROUTINE GYNECOLOGICAL EXAM: ICD-10-CM

## 2022-08-01 PROCEDURE — 90715 TDAP VACCINE 7 YRS/> IM: CPT | Performed by: NURSE PRACTITIONER

## 2022-08-01 PROCEDURE — 87491 CHLMYD TRACH DNA AMP PROBE: CPT

## 2022-08-01 PROCEDURE — 87591 N.GONORRHOEAE DNA AMP PROB: CPT

## 2022-08-01 PROCEDURE — 88175 CYTOPATH C/V AUTO FLUID REDO: CPT

## 2022-08-01 PROCEDURE — 87624 HPV HI-RISK TYP POOLED RSLT: CPT

## 2022-08-01 PROCEDURE — 90471 IMMUNIZATION ADMIN: CPT | Performed by: NURSE PRACTITIONER

## 2022-08-01 PROCEDURE — 99395 PREV VISIT EST AGE 18-39: CPT | Mod: 25 | Performed by: NURSE PRACTITIONER

## 2022-08-01 RX ORDER — NORETHINDRONE ACETATE AND ETHINYL ESTRADIOL .02; 1 MG/1; MG/1
1 TABLET ORAL DAILY
Qty: 84 TABLET | Refills: 3 | Status: SHIPPED | OUTPATIENT
Start: 2022-08-01 | End: 2022-10-24 | Stop reason: SDUPTHER

## 2022-08-01 ASSESSMENT — LIFESTYLE VARIABLES
DURING THE PAST 12 MONTHS, ON HOW MANY DAYS DID YOU USE ANYTHING ELSE TO GET HIGH: 0
DURING THE PAST 12 MONTHS, ON HOW MANY DAYS DID YOU DRINK MORE THAN A FEW SIPS OF BEER, WINE, OR ANY DRINK CONTAINING ALCOHOL: 0
HAVE YOU EVER RIDDEN IN A CAR DRIVEN BY SOMEONE WHO WAS HIGH OR HAD BEEN USING ALCOHOL OR DRUGS: NO
DURING THE PAST 12 MONTHS, ON HOW MANY DAYS DID YOU USE ANY TOBACCO OR NICOTINE PRODUCTS: 0
DURING THE PAST 12 MONTHS, ON HOW MANY DAYS DID YOU USE ANY MARIJUANA: 0
PART A TOTAL SCORE: 0

## 2022-08-01 ASSESSMENT — FIBROSIS 4 INDEX: FIB4 SCORE: 0.35

## 2022-08-01 NOTE — PROGRESS NOTES
So likely has a colon cancer screening subjective:     CC:   Chief Complaint   Patient presents with   • Gynecologic Exam       HPI:   Yadira Massey is a 21 y.o. female who presents for annual exam. She is feeling well and denies any complaints.    Oral contraceptive pill surveillance  Her cramps and breast tenderness have improved. She is not getting a menstrual cycle and is not experiencing cramping. She has been tolerating the norethindrone-ethinyl estradiol 1-20 mg-mcg daily. She is able to take daily and continues to be in a monogamous relationship.       Ob-Gyn/ History:    Patient has GYN provider: no  /Para:    Last Pap Smear:  3/18/21. Positive history of abnormal pap smears.  Gyn Surgery:  none  Current Contraceptive Method:  OCP. Currently sexually active.  Last menstrual period:  2 months ago.  Periods regular. Heavy bleeding.   No significant cramping, bloating/fluid retention, pelvic pain, or dyspareunia. No vaginal discharge  Urinary incontinence: no  Folate intake: none, discussed for future    Health Maintenance  Diet: Balanced, red meat once or twice   Exercise:gym for 1 hr/day 5 days/ week   Substance Abuse:  Discussed and reviewed  Safe in relationship.  Seat belt safety discussed.    Sun protection used.    Cancer screening  Colorectal Cancer Screening: n/a  Lung Cancer Screening: n/a    Cervical Cancer Screening:  Due today.   Breast Cancer Screening: n/a     Infectious disease screening/Immunizations  --STI Screening: will complete with pap smear.   --Practices safe sex. No condoms, single partner  --Immunizations:    Tetanus:  Due today.    Other immunizations: Declines COVID immunization.      She  has a past medical history of Back pain and SVT (supraventricular tachycardia) ([facility]) (2015).    She has no past medical history of Anxiety, Depression, Diabetes ([facility]), Hyperlipidemia, Hypertension, Kidney disease, or Thyroid disease.  She  has a past surgical history that  includes tonsillectomy; other cardiac surgery (2015); and other orthopedic surgery (Right, 2017).    Family History   Problem Relation Age of Onset   • Cancer Maternal Grandmother         breast   • Hypertension Mother    • No Known Problems Father    • No Known Problems Sister    • Heart Disease Maternal Grandfather    • Cancer Maternal Grandfather    • No Known Problems Sister    • Thyroid Paternal Aunt    • Thyroid Maternal Aunt        Social History     Socioeconomic History   • Marital status: Single     Spouse name: Not on file   • Number of children: Not on file   • Years of education: Not on file   • Highest education level: 12th grade   Occupational History   • Not on file   Tobacco Use   • Smoking status: Never Smoker   • Smokeless tobacco: Never Used   Vaping Use   • Vaping Use: Never used   Substance and Sexual Activity   • Alcohol use: Never   • Drug use: Never   • Sexual activity: Yes     Partners: Male     Birth control/protection: Pill   Other Topics Concern   • Not on file   Social History Narrative   • Not on file     Social Determinants of Health     Financial Resource Strain: Unknown   • Difficulty of Paying Living Expenses: Patient refused   Food Insecurity: Unknown   • Worried About Running Out of Food in the Last Year: Patient refused   • Ran Out of Food in the Last Year: Patient refused   Transportation Needs: No Transportation Needs   • Lack of Transportation (Medical): No   • Lack of Transportation (Non-Medical): No   Physical Activity: Sufficiently Active   • Days of Exercise per Week: 5 days   • Minutes of Exercise per Session: 40 min   Stress: No Stress Concern Present   • Feeling of Stress : Not at all   Social Connections: Moderately Isolated   • Frequency of Communication with Friends and Family: Three times a week   • Frequency of Social Gatherings with Friends and Family: Patient refused   • Attends [employer] Services: Never   • Active Member of Clubs or Organizations: No   •  "Attends Club or Organization Meetings: Never   • Marital Status: Living with partner   Intimate Partner Violence: Not on file   Housing Stability: Unknown   • Unable to Pay for Housing in the Last Year: No   • Number of Places Lived in the Last Year: Not on file   • Unstable Housing in the Last Year: No       Patient Active Problem List    Diagnosis Date Noted   • Oral contraceptive pill surveillance 04/25/2022   • Other fatigue 04/25/2022   • Abnormal vaginal Pap smear 03/22/2021   • Vitamin D deficiency 03/03/2021         Current Outpatient Medications   Medication Sig Dispense Refill   • norethindrone-ethinyl estradiol (MICROGESTIN 1/20) 1-20 MG-MCG per tablet Take 1 Tablet by mouth every day. 84 Tablet 3     No current facility-administered medications for this visit.     No Known Allergies    Review of Systems   Constitutional: Negative for fever, chills and malaise/fatigue.   HENT: Negative for congestion.    Eyes: Negative for pain.   Respiratory: Negative for cough and shortness of breath.    Cardiovascular: Negative for leg swelling.   Gastrointestinal: Negative for nausea, vomiting, abdominal pain and diarrhea.   Genitourinary: Negative for dysuria and hematuria.   Skin: Negative for rash.   Neurological: Negative for dizziness, focal weakness and headaches.   Endo/Heme/Allergies: Does not bruise/bleed easily.   Psychiatric/Behavioral: Negative for depression.  The patient is not nervous/anxious.      Objective:     Vital signs reviewed   /68 (BP Location: Left arm, Patient Position: Sitting, BP Cuff Size: Adult)   Pulse 75   Temp 36.6 °C (97.9 °F) (Temporal)   Ht 1.702 m (5' 7\")   Wt 61.2 kg (135 lb)   LMP 06/27/2022 (Approximate)   SpO2 97%   BMI 21.14 kg/m²   Body mass index is 21.14 kg/m².  Wt Readings from Last 4 Encounters:   08/01/22 61.2 kg (135 lb)   04/25/22 58.5 kg (129 lb)   04/13/22 54.4 kg (120 lb)   03/18/21 56 kg (123 lb 7.3 oz)       Physical Exam:  Constitutional: " Well-developed and well-nourished. Not diaphoretic. No distress.   Skin: Skin is warm and dry. No rash noted.  Head: Atraumatic without lesions.  Eyes: Conjunctivae and extraocular motions are normal. Pupils are equal, round, and reactive to light. No scleral icterus.   Ears:  External ears unremarkable. Tympanic membranes clear and intact.  Nose: Nares patent. Septum midline. Turbinates without erythema nor edema. No discharge.   Mouth/Throat: Dentition is intact. Tongue normal. Oropharynx is clear and moist. Posterior pharynx without erythema or exudates.  Neck: Supple, trachea midline. Normal range of motion. No thyromegaly present. No lymphadenopathy--cervical or supraclavicular.  Cardiovascular: Regular rate and rhythm, S1 and S2 without murmur, rubs, or gallops.  Lungs: Normal inspiratory effort, CTA bilaterally, no wheezes/rhonchi/rales  Breast: Breasts examined seated and supine. No skin changes, peau d'orange or nipple retraction. No discharge. No axillary or supraclavicular adenopathy. No masses or nodularity palpable.   Abdomen: Soft, non tender, and without distention. Active bowel sounds in all four quadrants. No rebound, guarding, masses or HSM.  :Perineum and external genitalia normal without rash. Vagina with white and thin discharge. Cervix without visible lesions or discharge. Bimanual exam without adnexal masses or cervical motion tenderness.  Musculoskeletal: All major joints AROM full in all directions without pain.  Neurological: Alert and oriented x 3.  Psychiatric:  Behavior, mood, and affect are appropriate.    A chaperone was offered to the patient during today's exam. Chaperone name: Kathy GOEL Student was present.    Assessment and Plan:     1. Well woman exam with routine gynecological exam  2. Screening for malignant neoplasm of cervix  Acute uncomplicated problem.  Well woman exam and Pap completed today.  Will follow-up with results in MyChart.  - THINPREP PAP W/CTNG;  Future    3. Oral contraceptive pill surveillance  Chronic stable problem.  Improvement with current birth control wishes to continue with medication.  Medication refilled.  Discussed annual follow-up.  - norethindrone-ethinyl estradiol (MICROGESTIN 1/20) 1-20 MG-MCG per tablet; Take 1 Tablet by mouth every day.  Dispense: 84 Tablet; Refill: 3    4. Need for vaccination  Acute uncomplicated problem.  She is in agreement to get her immunization today. I have placed the below orders and discussed them with an approved delegating provider. The MA is performing the below orders under the direction of Dr. Jones.  - Tdap Vaccine =>6YO IM      HCM:  Due for pap smear, chlamydia screening and Tdap.    Labs per orders  Immunizations per orders  Patient counseled about skin care, diet, supplements, prenatal vitamins, safe sex and exercise.    Follow-up: Return in about 1 year (around 8/1/2023), or if symptoms worsen or fail to improve.     Please note that this dictation was created using voice recognition software. I have made every reasonable attempt to correct obvious errors, but I expect that there are errors of grammar and possibly content that I did not discover before finalizing the note.

## 2022-08-01 NOTE — ASSESSMENT & PLAN NOTE
Her cramps and breast tenderness have improved. She is not getting a menstrual cycle and is not experiencing cramping. She has been tolerating the norethindrone-ethinyl estradiol 1-20 mg-mcg daily. She is able to take daily and continues to be in a monogamous relationship.

## 2022-08-02 DIAGNOSIS — Z12.4 SCREENING FOR MALIGNANT NEOPLASM OF CERVIX: ICD-10-CM

## 2022-08-02 LAB
AMBIGUOUS DTTM AMBI4: NORMAL
C TRACH DNA GENITAL QL NAA+PROBE: NEGATIVE
CYTOLOGY REG CYTOL: NORMAL
N GONORRHOEA DNA GENITAL QL NAA+PROBE: NEGATIVE
SPECIMEN SOURCE: NORMAL

## 2022-08-03 DIAGNOSIS — Z12.4 SCREENING FOR MALIGNANT NEOPLASM OF CERVIX: ICD-10-CM

## 2022-08-04 LAB
HPV HR 12 DNA CVX QL NAA+PROBE: NEGATIVE
HPV16 DNA SPEC QL NAA+PROBE: NEGATIVE
HPV18 DNA SPEC QL NAA+PROBE: NEGATIVE
SPECIMEN SOURCE: NORMAL

## 2022-10-24 DIAGNOSIS — Z30.41 ORAL CONTRACEPTIVE PILL SURVEILLANCE: ICD-10-CM

## 2022-10-25 RX ORDER — NORETHINDRONE ACETATE AND ETHINYL ESTRADIOL .02; 1 MG/1; MG/1
1 TABLET ORAL DAILY
Qty: 84 TABLET | Refills: 3 | Status: SHIPPED | OUTPATIENT
Start: 2022-10-25 | End: 2023-09-11

## 2022-10-26 NOTE — TELEPHONE ENCOUNTER
Received request via: Patient    Was the patient seen in the last year in this department? Yes    Does the patient have an active prescription (recently filled or refills available) for medication(s) requested? Yes. Pt has reached out to pharmacy and they are stating she still needs approval from you. Can you resend it?

## 2022-10-26 NOTE — TELEPHONE ENCOUNTER
Requested Prescriptions     Signed Prescriptions Disp Refills    norethindrone-ethinyl estradiol (MICROGESTIN 1/20) 1-20 MG-MCG per tablet 84 Tablet 3     Sig: Take 1 Tablet by mouth every day.     Authorizing Provider: KEVIN BLACKMON A.P.R.N.

## 2023-07-15 ENCOUNTER — OFFICE VISIT (OUTPATIENT)
Dept: URGENT CARE | Facility: PHYSICIAN GROUP | Age: 23
End: 2023-07-15

## 2023-07-15 VITALS
DIASTOLIC BLOOD PRESSURE: 62 MMHG | BODY MASS INDEX: 19.93 KG/M2 | HEIGHT: 67 IN | WEIGHT: 127 LBS | HEART RATE: 88 BPM | SYSTOLIC BLOOD PRESSURE: 108 MMHG | TEMPERATURE: 97.7 F | OXYGEN SATURATION: 95 % | RESPIRATION RATE: 18 BRPM

## 2023-07-15 DIAGNOSIS — Z02.4 ENCOUNTER FOR DEPARTMENT OF TRANSPORTATION (DOT) EXAMINATION FOR TRUCKING LICENSE: ICD-10-CM

## 2023-07-15 PROCEDURE — 3078F DIAST BP <80 MM HG: CPT | Performed by: PHYSICIAN ASSISTANT

## 2023-07-15 PROCEDURE — 7100 PR DOT PHYSICAL: Performed by: PHYSICIAN ASSISTANT

## 2023-07-15 PROCEDURE — 3074F SYST BP LT 130 MM HG: CPT | Performed by: PHYSICIAN ASSISTANT

## 2023-07-15 ASSESSMENT — FIBROSIS 4 INDEX: FIB4 SCORE: 0.37

## 2023-07-15 NOTE — PROGRESS NOTES
"Subjective:   Yadira Massey is a 22 y.o. female who presents for Other (DOT physical )      HPI    For complete documentation please see DOT physical form scanned into chart        Past Medical History:   Diagnosis Date    Back pain     SVT (supraventricular tachycardia) ([facility]) 2015     No Known Allergies     Objective:     /62   Pulse 88   Temp 36.5 °C (97.7 °F) (Temporal)   Resp 18   Ht 1.702 m (5' 7\")   Wt 57.6 kg (127 lb)   SpO2 95%   BMI 19.89 kg/m²     Physical Exam    Normal     Diagnosis and associated orders:     1. 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

## 2023-09-11 ENCOUNTER — OFFICE VISIT (OUTPATIENT)
Dept: MEDICAL GROUP | Facility: PHYSICIAN GROUP | Age: 23
End: 2023-09-11
Payer: COMMERCIAL

## 2023-09-11 VITALS
HEIGHT: 66 IN | WEIGHT: 131 LBS | TEMPERATURE: 98.6 F | OXYGEN SATURATION: 95 % | BODY MASS INDEX: 21.05 KG/M2 | SYSTOLIC BLOOD PRESSURE: 112 MMHG | HEART RATE: 89 BPM | DIASTOLIC BLOOD PRESSURE: 68 MMHG

## 2023-09-11 DIAGNOSIS — Z3A.01 LESS THAN 8 WEEKS GESTATION OF PREGNANCY: ICD-10-CM

## 2023-09-11 PROBLEM — N92.6 MISSED PERIOD: Status: ACTIVE | Noted: 2023-09-11

## 2023-09-11 LAB
POCT INT CON NEG: NEGATIVE
POCT INT CON POS: POSITIVE
POCT URINE PREGNANCY TEST: POSITIVE

## 2023-09-11 PROCEDURE — 3078F DIAST BP <80 MM HG: CPT | Performed by: NURSE PRACTITIONER

## 2023-09-11 PROCEDURE — 3074F SYST BP LT 130 MM HG: CPT | Performed by: NURSE PRACTITIONER

## 2023-09-11 PROCEDURE — 81025 URINE PREGNANCY TEST: CPT | Performed by: NURSE PRACTITIONER

## 2023-09-11 PROCEDURE — 99213 OFFICE O/P EST LOW 20 MIN: CPT | Performed by: NURSE PRACTITIONER

## 2023-09-11 ASSESSMENT — PATIENT HEALTH QUESTIONNAIRE - PHQ9: CLINICAL INTERPRETATION OF PHQ2 SCORE: 0

## 2023-09-11 ASSESSMENT — FIBROSIS 4 INDEX: FIB4 SCORE: 0.37

## 2023-09-11 NOTE — ASSESSMENT & PLAN NOTE
She stopped her birth control 2 months ago. Her last period was about 2 months ago, she is not sure of the date but is around 7/17/2023. She has had unprotected intercourse since stopping her birth control. She has taken 3 home pregnancy tests that are positive. This is her first pregnancy. Here for testing and needs referral. Currently taking daily multivitamin.

## 2023-09-11 NOTE — PROGRESS NOTES
"Subjective:     CC: Positive home pregnancy test    HPI:   Yadira presents today with the following:    Missed period  She stopped her birth control 2 months ago. Her last period was about 2 months ago, she is not sure of the date but is around 7/17/2023. She has had unprotected intercourse since stopping her birth control. She has taken 3 home pregnancy tests that are positive. This is her first pregnancy. Here for testing and needs referral. Currently taking daily multivitamin.     Past Medical History:   Diagnosis Date    Back pain     SVT [facility]supraventricular tachycardia) ([facility]) 2015       Social History     Tobacco Use    Smoking status: Never    Smokeless tobacco: Never   Vaping Use    Vaping Use: Never used   Substance Use Topics    Alcohol use: Never    Drug use: Never       Current Outpatient Medications Ordered in Epic   Medication Sig Dispense Refill    Multiple Vitamin (MULTIVITAMIN ADULT PO) Take  by mouth.       No current Epic-ordered facility-administered medications on file.       Allergies:  Patient has no known allergies.    Health Maintenance: reviewed    Objective:     Vital signs reviewed  Exam:  /68 (BP Location: Right arm, Patient Position: Sitting, BP Cuff Size: Adult)   Pulse 89   Temp 37 °C (98.6 °F) (Temporal)   Ht 1.676 m (5' 6\")   Wt 59.4 kg (131 lb)   LMP 07/17/2023 (Approximate) Comment: pt states around 2 months ago was last period  SpO2 95%   BMI 21.14 kg/m²  Body mass index is 21.14 kg/m².    Gen: Alert and oriented, No apparent distress.  Neck: Neck is supple, full ROM.  Lungs: Normal effort, no audible wheezes  CV: Skin pink, warm and dry.  Ext: No clubbing, cyanosis, edema.      Labs:      09/11/23 14:14   POC Urine Pregnancy Test Positive       Assessment & Plan:     22 y.o. female with the following -     1. Less than 8 weeks gestation of pregnancy  Acute uncomplicated problem.  Positive hCG in office today.  Referral placed to OB.  Recommend she switch her " multivitamin to daily prenatal vitamin.  Worse with blood in car each time.  Avoid alcohol, tobacco use, vaping and drug use.  Discussed healthy diet.  - POCT Pregnancy  - Referral to OB/Gyn      Return if symptoms worsen or fail to improve.    Please note that this dictation was created using voice recognition software. I have made every reasonable attempt to correct obvious errors, but I expect that there are errors of grammar and possibly content that I did not discover before finalizing the note.

## 2023-10-01 DIAGNOSIS — Z30.41 ORAL CONTRACEPTIVE PILL SURVEILLANCE: ICD-10-CM

## 2023-10-02 RX ORDER — NORETHINDRONE ACETATE AND ETHINYL ESTRADIOL .02; 1 MG/1; MG/1
1 TABLET ORAL DAILY
Qty: 84 TABLET | Refills: 0 | Status: SHIPPED | OUTPATIENT
Start: 2023-10-02

## 2023-10-02 NOTE — TELEPHONE ENCOUNTER
Requested Prescriptions     Pending Prescriptions Disp Refills   • norethindrone-ethinyl estradiol (MICROGESTIN 1/20) 1-20 MG-MCG per tablet [Pharmacy Med Name: NORETHIND-ETH ESTRAD 1-0.02 MG] 84 Tablet 0     Sig: TAKE 1 TABLET BY MOUTH EVERY DAY       CATRACHITO Araujo.

## 2023-11-01 ENCOUNTER — HOSPITAL ENCOUNTER (EMERGENCY)
Facility: MEDICAL CENTER | Age: 23
End: 2023-11-01
Attending: EMERGENCY MEDICINE
Payer: COMMERCIAL

## 2023-11-01 VITALS
RESPIRATION RATE: 16 BRPM | WEIGHT: 126.1 LBS | TEMPERATURE: 98.4 F | DIASTOLIC BLOOD PRESSURE: 55 MMHG | OXYGEN SATURATION: 98 % | BODY MASS INDEX: 20.27 KG/M2 | SYSTOLIC BLOOD PRESSURE: 94 MMHG | HEIGHT: 66 IN | HEART RATE: 95 BPM

## 2023-11-01 DIAGNOSIS — Z3A.12 12 WEEKS GESTATION OF PREGNANCY: ICD-10-CM

## 2023-11-01 DIAGNOSIS — R11.2 NAUSEA AND VOMITING, UNSPECIFIED VOMITING TYPE: ICD-10-CM

## 2023-11-01 LAB
ALBUMIN SERPL BCP-MCNC: 4.3 G/DL (ref 3.2–4.9)
ALBUMIN/GLOB SERPL: 1.4 G/DL
ALP SERPL-CCNC: 69 U/L (ref 30–99)
ALT SERPL-CCNC: 38 U/L (ref 2–50)
ANION GAP SERPL CALC-SCNC: 14 MMOL/L (ref 7–16)
AST SERPL-CCNC: 27 U/L (ref 12–45)
BASOPHILS # BLD AUTO: 0.1 % (ref 0–1.8)
BASOPHILS # BLD: 0.01 K/UL (ref 0–0.12)
BILIRUB SERPL-MCNC: 0.5 MG/DL (ref 0.1–1.5)
BUN SERPL-MCNC: 7 MG/DL (ref 8–22)
CALCIUM ALBUM COR SERPL-MCNC: 9 MG/DL (ref 8.5–10.5)
CALCIUM SERPL-MCNC: 9.2 MG/DL (ref 8.5–10.5)
CHLORIDE SERPL-SCNC: 101 MMOL/L (ref 96–112)
CO2 SERPL-SCNC: 19 MMOL/L (ref 20–33)
CREAT SERPL-MCNC: 0.42 MG/DL (ref 0.5–1.4)
EOSINOPHIL # BLD AUTO: 0 K/UL (ref 0–0.51)
EOSINOPHIL NFR BLD: 0 % (ref 0–6.9)
ERYTHROCYTE [DISTWIDTH] IN BLOOD BY AUTOMATED COUNT: 39.3 FL (ref 35.9–50)
GFR SERPLBLD CREATININE-BSD FMLA CKD-EPI: 140 ML/MIN/1.73 M 2
GLOBULIN SER CALC-MCNC: 3.1 G/DL (ref 1.9–3.5)
GLUCOSE SERPL-MCNC: 83 MG/DL (ref 65–99)
HCT VFR BLD AUTO: 40.5 % (ref 37–47)
HGB BLD-MCNC: 14.1 G/DL (ref 12–16)
IMM GRANULOCYTES # BLD AUTO: 0.03 K/UL (ref 0–0.11)
IMM GRANULOCYTES NFR BLD AUTO: 0.4 % (ref 0–0.9)
LIPASE SERPL-CCNC: 19 U/L (ref 11–82)
LYMPHOCYTES # BLD AUTO: 0.32 K/UL (ref 1–4.8)
LYMPHOCYTES NFR BLD: 4.1 % (ref 22–41)
MCH RBC QN AUTO: 29.6 PG (ref 27–33)
MCHC RBC AUTO-ENTMCNC: 34.8 G/DL (ref 32.2–35.5)
MCV RBC AUTO: 84.9 FL (ref 81.4–97.8)
MONOCYTES # BLD AUTO: 0.44 K/UL (ref 0–0.85)
MONOCYTES NFR BLD AUTO: 5.7 % (ref 0–13.4)
NEUTROPHILS # BLD AUTO: 6.93 K/UL (ref 1.82–7.42)
NEUTROPHILS NFR BLD: 89.7 % (ref 44–72)
NRBC # BLD AUTO: 0 K/UL
NRBC BLD-RTO: 0 /100 WBC (ref 0–0.2)
PLATELET # BLD AUTO: 194 K/UL (ref 164–446)
PMV BLD AUTO: 10.2 FL (ref 9–12.9)
POTASSIUM SERPL-SCNC: 3.6 MMOL/L (ref 3.6–5.5)
PROT SERPL-MCNC: 7.4 G/DL (ref 6–8.2)
RBC # BLD AUTO: 4.77 M/UL (ref 4.2–5.4)
SODIUM SERPL-SCNC: 134 MMOL/L (ref 135–145)
WBC # BLD AUTO: 7.7 K/UL (ref 4.8–10.8)

## 2023-11-01 PROCEDURE — 36415 COLL VENOUS BLD VENIPUNCTURE: CPT

## 2023-11-01 PROCEDURE — 80053 COMPREHEN METABOLIC PANEL: CPT

## 2023-11-01 PROCEDURE — 700111 HCHG RX REV CODE 636 W/ 250 OVERRIDE (IP): Mod: JZ | Performed by: EMERGENCY MEDICINE

## 2023-11-01 PROCEDURE — 83690 ASSAY OF LIPASE: CPT

## 2023-11-01 PROCEDURE — 700105 HCHG RX REV CODE 258: Performed by: EMERGENCY MEDICINE

## 2023-11-01 PROCEDURE — 96374 THER/PROPH/DIAG INJ IV PUSH: CPT

## 2023-11-01 PROCEDURE — 85025 COMPLETE CBC W/AUTO DIFF WBC: CPT

## 2023-11-01 PROCEDURE — 99284 EMERGENCY DEPT VISIT MOD MDM: CPT

## 2023-11-01 RX ORDER — SODIUM CHLORIDE, SODIUM LACTATE, POTASSIUM CHLORIDE, CALCIUM CHLORIDE 600; 310; 30; 20 MG/100ML; MG/100ML; MG/100ML; MG/100ML
1000 INJECTION, SOLUTION INTRAVENOUS ONCE
Status: COMPLETED | OUTPATIENT
Start: 2023-11-01 | End: 2023-11-01

## 2023-11-01 RX ORDER — SODIUM CHLORIDE 9 MG/ML
1000 INJECTION, SOLUTION INTRAVENOUS ONCE
Status: COMPLETED | OUTPATIENT
Start: 2023-11-01 | End: 2023-11-01

## 2023-11-01 RX ORDER — ONDANSETRON 2 MG/ML
4 INJECTION INTRAMUSCULAR; INTRAVENOUS ONCE
Status: COMPLETED | OUTPATIENT
Start: 2023-11-01 | End: 2023-11-01

## 2023-11-01 RX ORDER — ONDANSETRON 4 MG/1
4 TABLET, ORALLY DISINTEGRATING ORAL EVERY 6 HOURS PRN
Qty: 20 TABLET | Refills: 0 | Status: SHIPPED | OUTPATIENT
Start: 2023-11-01

## 2023-11-01 RX ADMIN — SODIUM CHLORIDE, POTASSIUM CHLORIDE, SODIUM LACTATE AND CALCIUM CHLORIDE 1000 ML: 600; 310; 30; 20 INJECTION, SOLUTION INTRAVENOUS at 13:03

## 2023-11-01 RX ADMIN — SODIUM CHLORIDE 1000 ML: 9 INJECTION, SOLUTION INTRAVENOUS at 14:56

## 2023-11-01 RX ADMIN — ONDANSETRON 4 MG: 2 INJECTION INTRAMUSCULAR; INTRAVENOUS at 12:59

## 2023-11-01 ASSESSMENT — FIBROSIS 4 INDEX: FIB4 SCORE: 0.37

## 2023-11-01 NOTE — ED PROVIDER NOTES
ED Provider Note    CHIEF COMPLAINT  Chief Complaint   Patient presents with    N/V     Ongoing x24 hours, unable to tolerate PO intake. Pt currently 12 weeks pregnant. Pt denies any vaginal bleeding or discharge.        EXTERNAL RECORDS REVIEWED  Last encounter was an outpatient visit in the family medicine clinic for pregnancy and referral to OB/GYN.    HPI/ROS  LIMITATION TO HISTORY   None  OUTSIDE HISTORIAN(S):  Partner at bedside who echoes the fact that the patient has a very limited diet and has been unable to tolerate much in the way of p.o.'s in the last 24 hours and has been sick throughout her pregnancy.    Yadira Massey is a 22 y.o. female who presents to the emergency department accompanied by her partner with chief complaint of nausea vomiting.  Patient's essentially had nausea and vomiting associated with her pregnancy since the beginning.  She about 12 weeks pregnant now.  She has been taking B12 and Unisom as recommended by her obstetrician.  They help her sleep at night.  She has lost weight approximately 8 pounds.  She continues, despite these medications, to have nausea and vomiting.  In the last 24 hours, she has been unable to tolerate p.o.'s and symptoms seem to be worsening.  No vaginal bleeding.  No abdominal pain.  No fever.  No urinary complaints.  She is     PAST MEDICAL HISTORY   has a past medical history of Back pain and SVT (supraventricular tachycardia) (2015).    SURGICAL HISTORY   has a past surgical history that includes tonsillectomy; other cardiac surgery (2015); and other orthopedic surgery (Right, 2017).    FAMILY HISTORY  Family History   Problem Relation Age of Onset    Cancer Maternal Grandmother         breast    Hypertension Mother     No Known Problems Father     No Known Problems Sister     Heart Disease Maternal Grandfather     Cancer Maternal Grandfather     No Known Problems Sister     Thyroid Paternal Aunt     Thyroid Maternal Aunt        SOCIAL  "HISTORY  Social History     Tobacco Use    Smoking status: Never    Smokeless tobacco: Never   Vaping Use    Vaping Use: Never used   Substance and Sexual Activity    Alcohol use: Never    Drug use: Never    Sexual activity: Yes     Partners: Male     Birth control/protection: Pill       CURRENT MEDICATIONS  Home Medications       Reviewed by Angela Tipton R.N. (Registered Nurse) on 11/01/23 at 1210  Med List Status: Partial     Medication Last Dose Status   Multiple Vitamin (MULTIVITAMIN ADULT PO)  Active   norethindrone-ethinyl estradiol (MICROGESTIN 1/20) 1-20 MG-MCG per tablet  Active                    ALLERGIES  No Known Allergies    PHYSICAL EXAM  VITAL SIGNS: /64   Pulse 98   Temp 36.7 °C (98 °F) (Temporal)   Resp 16   Ht 1.676 m (5' 6\")   Wt 57.2 kg (126 lb 1.7 oz)   LMP 07/17/2023 (Approximate) Comment: pt states around 2 months ago was last period  SpO2 98%   BMI 20.35 kg/m²    Vitals reviewed.  Constitutional: Patient is oriented to person, place, and time. Appears well-developed and well-nourished. Mild distress.    Head: Normocephalic and atraumatic.   Mouth/Throat: Oropharynx is clear and moist  Eyes: Conjunctivae are normal. Pupils are equal, round  Neck: Normal range of motion. Neck supple.   Cardiovascular: Normal rate, regular rhythm and normal heart sounds  Pulmonary/Chest: Effort normal and breath sounds normal. No respiratory distress, no wheezes, rhonchi, or rales.  Abdominal: Soft. Bowel sounds are normal. There is no tenderness, rebound or guarding, or peritoneal signs.  Musculoskeletal: No edema and no tenderness.   Neurological: Normal motor and sensory exam. No focal deficits.   Skin: Skin is warm and dry. No erythema. No pallor.   Psychiatric: Patient has a normal mood and affect.     DIAGNOSTIC STUDIES / PROCEDURES    LABS  Results for orders placed or performed during the hospital encounter of 11/01/23   CBC WITH DIFFERENTIAL   Result Value Ref Range    WBC 7.7 4.8 - " 10.8 K/uL    RBC 4.77 4.20 - 5.40 M/uL    Hemoglobin 14.1 12.0 - 16.0 g/dL    Hematocrit 40.5 37.0 - 47.0 %    MCV 84.9 81.4 - 97.8 fL    MCH 29.6 27.0 - 33.0 pg    MCHC 34.8 32.2 - 35.5 g/dL    RDW 39.3 35.9 - 50.0 fL    Platelet Count 194 164 - 446 K/uL    MPV 10.2 9.0 - 12.9 fL    Neutrophils-Polys 89.70 (H) 44.00 - 72.00 %    Lymphocytes 4.10 (L) 22.00 - 41.00 %    Monocytes 5.70 0.00 - 13.40 %    Eosinophils 0.00 0.00 - 6.90 %    Basophils 0.10 0.00 - 1.80 %    Immature Granulocytes 0.40 0.00 - 0.90 %    Nucleated RBC 0.00 0.00 - 0.20 /100 WBC    Neutrophils (Absolute) 6.93 1.82 - 7.42 K/uL    Lymphs (Absolute) 0.32 (L) 1.00 - 4.80 K/uL    Monos (Absolute) 0.44 0.00 - 0.85 K/uL    Eos (Absolute) 0.00 0.00 - 0.51 K/uL    Baso (Absolute) 0.01 0.00 - 0.12 K/uL    Immature Granulocytes (abs) 0.03 0.00 - 0.11 K/uL    NRBC (Absolute) 0.00 K/uL   CMP   Result Value Ref Range    Sodium 134 (L) 135 - 145 mmol/L    Potassium 3.6 3.6 - 5.5 mmol/L    Chloride 101 96 - 112 mmol/L    Co2 19 (L) 20 - 33 mmol/L    Anion Gap 14.0 7.0 - 16.0    Glucose 83 65 - 99 mg/dL    Bun 7 (L) 8 - 22 mg/dL    Creatinine 0.42 (L) 0.50 - 1.40 mg/dL    Calcium 9.2 8.5 - 10.5 mg/dL    Correct Calcium 9.0 8.5 - 10.5 mg/dL    AST(SGOT) 27 12 - 45 U/L    ALT(SGPT) 38 2 - 50 U/L    Alkaline Phosphatase 69 30 - 99 U/L    Total Bilirubin 0.5 0.1 - 1.5 mg/dL    Albumin 4.3 3.2 - 4.9 g/dL    Total Protein 7.4 6.0 - 8.2 g/dL    Globulin 3.1 1.9 - 3.5 g/dL    A-G Ratio 1.4 g/dL   LIPASE   Result Value Ref Range    Lipase 19 11 - 82 U/L   ESTIMATED GFR   Result Value Ref Range    GFR (CKD-EPI) 140 >60 mL/min/1.73 m 2     RADIOLOGY  Bedside ultrasound reveals an intrauterine gestation consistent with reported dates.  Normal fetal heart tones visualized.    COURSE & MEDICAL DECISION MAKING    ED Observation Status? No; Patient does not meet criteria for ED Observation.     INITIAL ASSESSMENT, COURSE AND PLAN  Care Narrative:     This is a pleasant  22-year-old female.  Previously healthy.  She is approximately 12 weeks pregnant.  She is experienced nausea and vomiting throughout her pregnancy.  It is worsened in the last 24 hours.  No fever.  No abdominal pain.  No vaginal bleeding or urinary symptoms.  Have ordered an IV start, IV fluids labs including a CBC, chemistry, lipase.  She will be treated with antiemetics.    3:19 PM patient is feeling better.  IV fluids infusing.  She is tolerated fluids and crackers.  Anticipate discharge to home.  We discussed lab results which show some mild dehydration.  She will be discharged home with a course of Zofran.  She is encouraged to follow-up with her OB/GYN, Dr. Zuñiga.  She is well-appearing and nontoxic.  She is given strict discharge precautions.    HYDRATION: Based on the patient's presentation of Acute Vomiting and Inability to take oral fluids the patient was given IV fluids. IV Hydration was used because oral hydration was not adequate alone. Upon recheck following hydration, the patient was improved.      DISPOSITION AND DISCUSSIONS  I have discussed management of the patient with the following physicians and HIRAM's:  None    Discussion of management with other QHP or appropriate source(s): None     Escalation of care considered, and ultimately not performed:acute inpatient care management, however at this time, the patient is most appropriate for outpatient management    Barriers to care at this time, including but not limited to:  None .     Decision tools and prescription drugs considered including, but not limited to:  None .    FINAL DIAGNOSIS  1. Nausea and vomiting, unspecified vomiting type    2. 12 weeks gestation of pregnancy           Electronically signed by: Mary Teixeira D.O., 11/1/2023 12:44 PM

## 2023-11-01 NOTE — ED NOTES
Pt d/c home ambulatory with family. Pt given d/c instructions and signed d/c paper. Pt educated on follow up plan and medication usage, pt verbalized understanding of d/c instructions. PT iv removed with bleeding controlled tip intact. PT vs stable. Pt had all belongings at d/c.

## 2023-11-01 NOTE — ED NOTES
PT placed in gown, placed on monitor, complains of nausea vomiting, no abd pain, support person at bedside

## 2023-11-01 NOTE — ED TRIAGE NOTES
"Chief Complaint   Patient presents with    N/V     Ongoing x24 hours, unable to tolerate PO intake. Pt currently 12 weeks pregnant. Pt denies any vaginal bleeding or discharge.      /70   Pulse (!) 119   Temp 36.7 °C (98 °F) (Temporal)   Resp 18   Ht 1.676 m (5' 6\")   Wt 57.2 kg (126 lb 1.7 oz)   LMP 07/17/2023 (Approximate) Comment: pt states around 2 months ago was last period  SpO2 99%   BMI 20.35 kg/m²     Pt ambulatory to triage for above.   "

## 2024-02-24 ENCOUNTER — HOSPITAL ENCOUNTER (EMERGENCY)
Facility: MEDICAL CENTER | Age: 24
End: 2024-02-24
Attending: EMERGENCY MEDICINE
Payer: COMMERCIAL

## 2024-02-24 ENCOUNTER — APPOINTMENT (OUTPATIENT)
Dept: RADIOLOGY | Facility: MEDICAL CENTER | Age: 24
End: 2024-02-24
Attending: EMERGENCY MEDICINE
Payer: COMMERCIAL

## 2024-02-24 VITALS
RESPIRATION RATE: 20 BRPM | DIASTOLIC BLOOD PRESSURE: 66 MMHG | SYSTOLIC BLOOD PRESSURE: 115 MMHG | BODY MASS INDEX: 21.63 KG/M2 | TEMPERATURE: 97.4 F | HEIGHT: 67 IN | OXYGEN SATURATION: 97 % | HEART RATE: 88 BPM | WEIGHT: 137.79 LBS

## 2024-02-24 DIAGNOSIS — R07.9 ACUTE CHEST PAIN: ICD-10-CM

## 2024-02-24 DIAGNOSIS — R06.02 SOB (SHORTNESS OF BREATH): ICD-10-CM

## 2024-02-24 LAB
ALBUMIN SERPL BCP-MCNC: 3.6 G/DL (ref 3.2–4.9)
ALBUMIN/GLOB SERPL: 1.1 G/DL
ALP SERPL-CCNC: 96 U/L (ref 30–99)
ALT SERPL-CCNC: 19 U/L (ref 2–50)
ANION GAP SERPL CALC-SCNC: 13 MMOL/L (ref 7–16)
AST SERPL-CCNC: 19 U/L (ref 12–45)
BASOPHILS # BLD AUTO: 0.2 % (ref 0–1.8)
BASOPHILS # BLD: 0.02 K/UL (ref 0–0.12)
BILIRUB SERPL-MCNC: 0.3 MG/DL (ref 0.1–1.5)
BUN SERPL-MCNC: 7 MG/DL (ref 8–22)
CALCIUM ALBUM COR SERPL-MCNC: 9.3 MG/DL (ref 8.5–10.5)
CALCIUM SERPL-MCNC: 9 MG/DL (ref 8.5–10.5)
CHLORIDE SERPL-SCNC: 102 MMOL/L (ref 96–112)
CO2 SERPL-SCNC: 19 MMOL/L (ref 20–33)
CREAT SERPL-MCNC: 0.46 MG/DL (ref 0.5–1.4)
D DIMER PPP IA.FEU-MCNC: 0.61 UG/ML (FEU) (ref 0–0.5)
EKG IMPRESSION: NORMAL
EOSINOPHIL # BLD AUTO: 0.02 K/UL (ref 0–0.51)
EOSINOPHIL NFR BLD: 0.2 % (ref 0–6.9)
ERYTHROCYTE [DISTWIDTH] IN BLOOD BY AUTOMATED COUNT: 41.3 FL (ref 35.9–50)
GFR SERPLBLD CREATININE-BSD FMLA CKD-EPI: 137 ML/MIN/1.73 M 2
GLOBULIN SER CALC-MCNC: 3.3 G/DL (ref 1.9–3.5)
GLUCOSE SERPL-MCNC: 79 MG/DL (ref 65–99)
HCT VFR BLD AUTO: 35.8 % (ref 37–47)
HGB BLD-MCNC: 12 G/DL (ref 12–16)
IMM GRANULOCYTES # BLD AUTO: 0.03 K/UL (ref 0–0.11)
IMM GRANULOCYTES NFR BLD AUTO: 0.3 % (ref 0–0.9)
LIPASE SERPL-CCNC: 19 U/L (ref 11–82)
LYMPHOCYTES # BLD AUTO: 1.16 K/UL (ref 1–4.8)
LYMPHOCYTES NFR BLD: 12 % (ref 22–41)
MCH RBC QN AUTO: 29 PG (ref 27–33)
MCHC RBC AUTO-ENTMCNC: 33.5 G/DL (ref 32.2–35.5)
MCV RBC AUTO: 86.5 FL (ref 81.4–97.8)
MONOCYTES # BLD AUTO: 0.67 K/UL (ref 0–0.85)
MONOCYTES NFR BLD AUTO: 6.9 % (ref 0–13.4)
NEUTROPHILS # BLD AUTO: 7.75 K/UL (ref 1.82–7.42)
NEUTROPHILS NFR BLD: 80.4 % (ref 44–72)
NRBC # BLD AUTO: 0 K/UL
NRBC BLD-RTO: 0 /100 WBC (ref 0–0.2)
PLATELET # BLD AUTO: 202 K/UL (ref 164–446)
PMV BLD AUTO: 10 FL (ref 9–12.9)
POTASSIUM SERPL-SCNC: 3.8 MMOL/L (ref 3.6–5.5)
PROT SERPL-MCNC: 6.9 G/DL (ref 6–8.2)
RBC # BLD AUTO: 4.14 M/UL (ref 4.2–5.4)
SODIUM SERPL-SCNC: 134 MMOL/L (ref 135–145)
TROPONIN T SERPL-MCNC: <6 NG/L (ref 6–19)
TROPONIN T SERPL-MCNC: <6 NG/L (ref 6–19)
WBC # BLD AUTO: 9.7 K/UL (ref 4.8–10.8)

## 2024-02-24 PROCEDURE — 71275 CT ANGIOGRAPHY CHEST: CPT

## 2024-02-24 PROCEDURE — 36415 COLL VENOUS BLD VENIPUNCTURE: CPT

## 2024-02-24 PROCEDURE — 99284 EMERGENCY DEPT VISIT MOD MDM: CPT

## 2024-02-24 PROCEDURE — 83690 ASSAY OF LIPASE: CPT

## 2024-02-24 PROCEDURE — 71045 X-RAY EXAM CHEST 1 VIEW: CPT

## 2024-02-24 PROCEDURE — 93005 ELECTROCARDIOGRAM TRACING: CPT | Performed by: EMERGENCY MEDICINE

## 2024-02-24 PROCEDURE — 93005 ELECTROCARDIOGRAM TRACING: CPT

## 2024-02-24 PROCEDURE — 700117 HCHG RX CONTRAST REV CODE 255: Performed by: EMERGENCY MEDICINE

## 2024-02-24 PROCEDURE — 84484 ASSAY OF TROPONIN QUANT: CPT

## 2024-02-24 PROCEDURE — 85025 COMPLETE CBC W/AUTO DIFF WBC: CPT

## 2024-02-24 PROCEDURE — 80053 COMPREHEN METABOLIC PANEL: CPT

## 2024-02-24 PROCEDURE — 85379 FIBRIN DEGRADATION QUANT: CPT

## 2024-02-24 RX ADMIN — IOHEXOL 60 ML: 350 INJECTION, SOLUTION INTRAVENOUS at 15:15

## 2024-02-24 ASSESSMENT — FIBROSIS 4 INDEX: FIB4 SCORE: 0.54

## 2024-02-24 NOTE — DISCHARGE INSTRUCTIONS
Please discuss the following findings with your regular doctor:  CT-CTA CHEST PULMONARY ARTERY W/ RECONS   Final Result      1.  No CT evidence of pulmonary embolism.      2.  No consolidations identified.            DX-CHEST-PORTABLE (1 VIEW)   Final Result      No acute cardiac or pulmonary abnormalities are identified.        Labs Reviewed   CBC WITH DIFFERENTIAL - Abnormal; Notable for the following components:       Result Value    RBC 4.14 (*)     Hematocrit 35.8 (*)     Neutrophils-Polys 80.40 (*)     Lymphocytes 12.00 (*)     Neutrophils (Absolute) 7.75 (*)     All other components within normal limits    Narrative:     Biotin intake of greater than 5 mg per day may interfere with  troponin levels, causing false low values.   COMP METABOLIC PANEL - Abnormal; Notable for the following components:    Sodium 134 (*)     Co2 19 (*)     Bun 7 (*)     Creatinine 0.46 (*)     All other components within normal limits    Narrative:     Biotin intake of greater than 5 mg per day may interfere with  troponin levels, causing false low values.   D-DIMER - Abnormal; Notable for the following components:    D-Dimer 0.61 (*)     All other components within normal limits   TROPONIN    Narrative:     Biotin intake of greater than 5 mg per day may interfere with  troponin levels, causing false low values.   LIPASE    Narrative:     Biotin intake of greater than 5 mg per day may interfere with  troponin levels, causing false low values.   ESTIMATED GFR    Narrative:     Biotin intake of greater than 5 mg per day may interfere with  troponin levels, causing false low values.   TROPONIN    Narrative:     Biotin intake of greater than 5 mg per day may interfere with  troponin levels, causing false low values.   TROPONIN

## 2024-02-24 NOTE — ED PROVIDER NOTES
ER Provider Note    Scribed for Michel Lea M.d. by Rocio Martell. 2/24/2024  11:46 AM    Primary Care Provider: YASMIN Holcomb    CHIEF COMPLAINT  Chief Complaint   Patient presents with    Chest Pain     Patient reports mid chest pain radiates to right side of back. Patient is 28 weeks pregnant. Patient reports ablation in 2015 for SVT.      HPI/ROS  LIMITATION TO HISTORY   Select: : None  OUTSIDE HISTORIAN(S):  None    Yadira Massey is a 23 y.o. female who presents to the ED complaining of chest pain onset 3 AM this morning. The patient reports that the pain is a sharp constant pain to the middle of her chest. She denies any alleviating or exacerbating factors. She reports trying TUMS with no alleviation. She notes a history of an ablation for a SVT in 2015. She denies history of a MI. She notes also having some shortness of breath as of last night. She notes that she is currently 28 weeks pregnant. She notes that this is her first pregnancy. She denies pregnancy complications this far.    PAST MEDICAL HISTORY  Past Medical History:   Diagnosis Date    Back pain     SVT (supraventricular tachycardia) 2015       SURGICAL HISTORY  Past Surgical History:   Procedure Laterality Date    OTHER ORTHOPEDIC SURGERY Right 2017    ankle reconstruction    OTHER CARDIAC SURGERY  2015    ablation d/t SVT    TONSILLECTOMY         FAMILY HISTORY  Family History   Problem Relation Age of Onset    Cancer Maternal Grandmother         breast    Hypertension Mother     No Known Problems Father     No Known Problems Sister     Heart Disease Maternal Grandfather     Cancer Maternal Grandfather     No Known Problems Sister     Thyroid Paternal Aunt     Thyroid Maternal Aunt        SOCIAL HISTORY   reports that she has never smoked. She has never used smokeless tobacco. She reports that she does not drink alcohol and does not use drugs.    CURRENT MEDICATIONS  Previous Medications    MULTIPLE VITAMIN  "(MULTIVITAMIN ADULT PO)    Take  by mouth.    NORETHINDRONE-ETHINYL ESTRADIOL (MICROGESTIN 1/20) 1-20 MG-MCG PER TABLET    TAKE 1 TABLET BY MOUTH EVERY DAY    ONDANSETRON (ZOFRAN ODT) 4 MG TABLET DISPERSIBLE    Take 1 Tablet by mouth every 6 hours as needed for Nausea/Vomiting.       ALLERGIES  Patient has no known allergies.    PHYSICAL EXAM  /79   Pulse 92   Temp 36.2 °C (97.1 °F) (Temporal)   Resp 18   Ht 1.702 m (5' 7\")   Wt 62.5 kg (137 lb 12.6 oz)   LMP 07/17/2023 (Approximate) Comment: pt states around 2 months ago was last period  SpO2 97%   BMI 21.58 kg/m²   Constitutional: Alert in no apparent distress.  HENT: No signs of trauma, Bilateral external ears normal, Nose normal. Uvula midline.   Eyes: Pupils are equal and reactive, Conjunctiva normal, Non-icteric.   Neck: Normal range of motion, No tenderness, Supple, No stridor.   Lymphatic: No lymphadenopathy noted.   Cardiovascular: Regular rate and rhythm, no murmurs.   Thorax & Lungs: Normal breath sounds, No respiratory distress, No wheezing, No chest tenderness.   Abdomen: Bowel sounds normal, Soft, No tenderness, No peritoneal signs, No masses, No pulsatile masses.   Skin: Warm, Dry, No erythema, No rash.   Back: No bony tenderness, No CVA tenderness.   Extremities: Intact distal pulses, No edema, 2+ peripheral pulses. No tenderness, No cyanosis.  Musculoskeletal: Good range of motion in all major joints. No tenderness to palpation or major deformities noted.   Neurologic: Alert , Normal motor function, Normal sensory function, No focal deficits noted.   Psychiatric: Affect normal, Judgment normal, Mood normal.      DIAGNOSTIC STUDIES    Labs:   Labs Reviewed   CBC WITH DIFFERENTIAL - Abnormal; Notable for the following components:       Result Value    RBC 4.14 (*)     Hematocrit 35.8 (*)     Neutrophils-Polys 80.40 (*)     Lymphocytes 12.00 (*)     Neutrophils (Absolute) 7.75 (*)     All other components within normal limits    " Narrative:     Biotin intake of greater than 5 mg per day may interfere with  troponin levels, causing false low values.   COMP METABOLIC PANEL - Abnormal; Notable for the following components:    Sodium 134 (*)     Co2 19 (*)     Bun 7 (*)     Creatinine 0.46 (*)     All other components within normal limits    Narrative:     Biotin intake of greater than 5 mg per day may interfere with  troponin levels, causing false low values.   D-DIMER - Abnormal; Notable for the following components:    D-Dimer 0.61 (*)     All other components within normal limits   TROPONIN    Narrative:     Biotin intake of greater than 5 mg per day may interfere with  troponin levels, causing false low values.   LIPASE    Narrative:     Biotin intake of greater than 5 mg per day may interfere with  troponin levels, causing false low values.   ESTIMATED GFR    Narrative:     Biotin intake of greater than 5 mg per day may interfere with  troponin levels, causing false low values.   TROPONIN    Narrative:     Biotin intake of greater than 5 mg per day may interfere with  troponin levels, causing false low values.      EKG:   I have independently interpreted this EKG  Results for orders placed or performed during the hospital encounter of 23   EKG   Result Value Ref Range    Report       [facility] Emergency Dept.    Test Date:  2024  Pt Name:    XIOMY LEE              Department: ER  MRN:        [id number]                      Room:  Gender:     Female                       Technician: [id number]  :        2000                   Requested By:ER TRIAGE PROTOCOL  Order #:    [id number]                    Reading MD:    Measurements  Intervals                                Axis  Rate:       99                           P:          10  TN:         112                          QRS:        65  QRSD:       79                           T:          -8  QT:         343  QTc:        441    Interpretive  Statements  Sinus rhythm  RSR' in V1 or V2, probably normal variant  Borderline T wave abnormalities  No previous ECG available for comparison         Radiology:   The attending emergency physician has independently interpreted the diagnostic imaging associated with this visit and am waiting the final reading from the radiologist.   Preliminary interpretation is a follows: No acute abnormalities    Radiologist interpretation:   CT-CTA CHEST PULMONARY ARTERY W/ RECONS   Final Result      1.  No CT evidence of pulmonary embolism.      2.  No consolidations identified.            DX-CHEST-PORTABLE (1 VIEW)   Final Result      No acute cardiac or pulmonary abnormalities are identified.         COURSE & MEDICAL DECISION MAKING     ED Observation Status? No; Patient does not meet criteria for ED Observation.     INITIAL ASSESSMENT, COURSE AND PLAN  Care Narrative:     12:15 PM - Patient was evaluated at bedside; Patient is a 23 y.o. female who presents for evaluation of chest pain associated with shortness of breath. Patient has a history of SVT. Discussed with patient and/or family that labs, imaging, and EKG will be ordered to further evaluate. EKG, CBC w/ diff, CMP, Troponin, Lipase, D-Dimer, and DX-Chest ordered.   Patient and/or family agrees to the plan of care.  Differential Diagnoses:    #acute chest pain    No unilateral leg swelling  No hemoptysis  No personal or family hx of DVT or PE  No recent surgeries    CBC negative for significant anemia/leukocytosis.  BMP negative for significant electrolyte abnormality.  Troponin/EKG (interpreted by me) without acute ischemia  Heart score 1    Given elevated D-dimer plan for CT of chest despite pregnancy caution patient the risk of radiation to her and child however I believe given her sudden onset of shortness of breath and sudden onset of chest pain that a CT scan is warranted to rule out pulmonary embolism in the setting of pregnancy  CT with no evidence of pulmonary  embolism at this time  Patient agrees to return if symptoms worsen or progress or if shortness of breath progresses  1:01 PM - Patient was reevaluated at bedside. Discussed lab and radiology results with the patient and informed them that the D-dimer was elevated, and to further evaluate given concerns for possible PE a CT scan will be ordered. Patient agrees to this plan of care. Ordered CT-CTA Chest pulmonary artery with recons. Ordered second troponin.     3:32 PM -  I discussed the patient's diagnostic study results which show no evidence of PE or acute abnormality. Noted the patient's exam and vitals at this time are reassuring. Discussed plan for discharge; I advised the patient to return to the [facility] ED with any new or worsening symptoms. Advised on plan to follow up with the patient's PCP in three days. Patient was given the opportunity to ask any questions. I addressed all questions or concerns and the patient is stable for discharge at this time. Patient verbalizes understanding and agreement to this plan of care.      \  DISPOSITION AND DISCUSSIONS  I have discussed management of the patient with the following physicians and HIRAM's:  None    Discussion of management with other QHP or appropriate source(s): None     Escalation of care considered, and ultimately not performed: acute inpatient care management, however at this time, the patient is most appropriate for outpatient management.    Barriers to care at this time, including but not limited to:  None .     Decision tools and prescription drugs considered including, but not limited to: Pain Medications Tylenol, D-dimer elevated, and HEART Score   .    The patient will return for new or worsening symptoms and is stable at the time of discharge.    The patient is referred to a primary physician for blood pressure management, diabetic screening, and for all other preventative health concerns.    DISPOSITION:  Patient will be discharged home in stable  condition.    FOLLOW UP:  YASMIN Holcomb  [address]  [facility] [address] [phone]    In 3 days      [facility], Emergency Dept  [address] [facility] [address] [phone]    If symptoms worsen    FINAL DIAGNOSIS  1. Acute chest pain    2. SOB (shortness of breath)       IRocio (Scribe), am scribing for, and in the presence of, Michel Lea M.D..    Electronically signed by: Rocio Martell (Scribe), 2/24/2024    IMichel M.D. personally performed the services described in this documentation, as scribed by Rocio Martell in my presence, and it is both accurate and complete.      The note accurately reflects work and decisions made by me.  Michel Lea M.D.  2/24/2024  6:38 PM

## 2024-02-24 NOTE — ED NOTES
X-ray at bedside, pt resting in bed, VSS on RA, GCS 15, NAD, family at bedside, pt aware POC to await further results

## 2024-02-24 NOTE — ED TRIAGE NOTES
Chief Complaint   Patient presents with    Chest Pain     Patient reports mid chest pain radiates to right side of back. Patient is 28 weeks pregnant. Patient reports ablation in 2015 for SVT.

## 2024-08-28 ENCOUNTER — OFFICE VISIT (OUTPATIENT)
Dept: MEDICAL GROUP | Facility: PHYSICIAN GROUP | Age: 24
End: 2024-08-28
Payer: COMMERCIAL

## 2024-08-28 VITALS
SYSTOLIC BLOOD PRESSURE: 94 MMHG | TEMPERATURE: 98 F | WEIGHT: 129 LBS | HEIGHT: 67 IN | HEART RATE: 96 BPM | DIASTOLIC BLOOD PRESSURE: 60 MMHG | BODY MASS INDEX: 20.25 KG/M2 | OXYGEN SATURATION: 97 %

## 2024-08-28 DIAGNOSIS — G89.29 CHRONIC RECTAL PAIN: ICD-10-CM

## 2024-08-28 DIAGNOSIS — K62.89 CHRONIC RECTAL PAIN: ICD-10-CM

## 2024-08-28 DIAGNOSIS — K64.4 EXTERNAL HEMORRHOID: ICD-10-CM

## 2024-08-28 PROCEDURE — 99214 OFFICE O/P EST MOD 30 MIN: CPT | Performed by: NURSE PRACTITIONER

## 2024-08-28 PROCEDURE — 3078F DIAST BP <80 MM HG: CPT | Performed by: NURSE PRACTITIONER

## 2024-08-28 PROCEDURE — 3074F SYST BP LT 130 MM HG: CPT | Performed by: NURSE PRACTITIONER

## 2024-08-28 RX ORDER — HYDROCORTISONE ACETATE 25 MG/1
25 SUPPOSITORY RECTAL EVERY 12 HOURS
Qty: 14 SUPPOSITORY | Refills: 0 | Status: SHIPPED | OUTPATIENT
Start: 2024-08-28 | End: 2024-09-04

## 2024-08-28 ASSESSMENT — FIBROSIS 4 INDEX: FIB4 SCORE: 0.52

## 2024-08-28 NOTE — PROGRESS NOTES
"Subjective:     CC: Bleeding/pain with BM    HPI:   Yadira presents today with the following:    Epic was in downtime during patient's appointment.    Rectal pain x 3 months.  Aggravated with sitting long periods and when has a BM there is bright red blood.  She does have pain with BM.  Pain severity 10/10.  She has seen her OB and was told it was hemorrhoids and told she is healed from the birth.  May 2024 she had a 10 pound baby by vaginal birth at 40 weeks.  Reports she did have an episiotomy.  Alleviating factors include none.  Interventions tried include stool softener.  She has noticed a lump on the rectum.  Her BMs are soft.  She has been avoiding milk and cheese.  Bowel movements are every other day.  She is breast-feeding.  Denies fever, chills, nausea, vomiting, dizziness or lightheadedness.        Past Medical History:   Diagnosis Date    Back pain     SVT (supraventricular tachycardia) [facility] 2015       Social History     Tobacco Use    Smoking status: Never    Smokeless tobacco: Never   Vaping Use    Vaping status: Never Used   Substance Use Topics    Alcohol use: Never    Drug use: Never       Current Outpatient Medications Ordered in Epic   Medication Sig Dispense Refill    hydrocortisone (ANUSOL-HC) 25 MG Suppos Insert 1 Suppository into the rectum every 12 hours for 7 days. 14 Suppository 0    ondansetron (ZOFRAN ODT) 4 MG TABLET DISPERSIBLE Take 1 Tablet by mouth every 6 hours as needed for Nausea/Vomiting. 20 Tablet 0    norethindrone-ethinyl estradiol (MICROGESTIN 1/20) 1-20 MG-MCG per tablet TAKE 1 TABLET BY MOUTH EVERY DAY 84 Tablet 0    Multiple Vitamin (MULTIVITAMIN ADULT PO) Take  by mouth.       No current Epic-ordered facility-administered medications on file.       Allergies:  Patient has no known allergies.    Health Maintenance: Reviewed.       Objective:     Vital signs reviewed  Exam:  BP 94/60   Pulse 96   Temp 36.7 °C (98 °F) (Temporal)   Ht 1.702 m (5' 7\")   Wt 58.5 kg (129 " lb)   LMP 07/28/2024 (Approximate)   SpO2 97%   BMI 20.20 kg/m²  Body mass index is 20.2 kg/m².    Gen: Alert and oriented, No apparent distress.  Lungs: Normal effort, CTA bilaterally, no wheezes, rhonchi, or rales.    CV: Regular rate and rhythm. No murmurs, rubs, or gallops.  Abd:     No distention.  Bowel sounds present all 4 quadrants.  No pain on exam.  Rectal: Medium size external hemorrhoid at 6 o'clock position.  Unable to complete internal examination as patient did have pain.  Exam was stopped.    A chaperone was offered to the patient during today's exam. Chaperone name: Jo Rodas MA was present.      Assessment & Plan:     23 y.o. female with the following -     1. External hemorrhoid  Chronic exacerbated problem.  There is an external hemorrhoid present.  Will start hydrocortisone suppository and referred to GI.  - Referral to Gastroenterology  - hydrocortisone (ANUSOL-HC) 25 MG Suppos; Insert 1 Suppository into the rectum every 12 hours for 7 days.  Dispense: 14 Suppository; Refill: 0    2. Chronic rectal pain  Chronic exacerbated problem.  There is a external hemorrhoid present on exam.  Will try treatment with hydrocortisone suppository.  Referral to GI.  - Referral to Gastroenterology      Return if symptoms worsen or fail to improve.    Please note that this dictation was created using voice recognition software. I have made every reasonable attempt to correct obvious errors, but I expect that there are errors of grammar and possibly content that I did not discover before finalizing the note.